# Patient Record
Sex: FEMALE | Race: WHITE | NOT HISPANIC OR LATINO | URBAN - METROPOLITAN AREA
[De-identification: names, ages, dates, MRNs, and addresses within clinical notes are randomized per-mention and may not be internally consistent; named-entity substitution may affect disease eponyms.]

---

## 2017-01-13 ENCOUNTER — OUTPATIENT (OUTPATIENT)
Dept: OUTPATIENT SERVICES | Facility: HOSPITAL | Age: 47
LOS: 1 days | End: 2017-01-13
Payer: COMMERCIAL

## 2017-01-13 ENCOUNTER — APPOINTMENT (OUTPATIENT)
Dept: ULTRASOUND IMAGING | Facility: HOSPITAL | Age: 47
End: 2017-01-13

## 2017-01-13 PROCEDURE — 93880 EXTRACRANIAL BILAT STUDY: CPT

## 2017-01-23 ENCOUNTER — APPOINTMENT (OUTPATIENT)
Dept: MRI IMAGING | Facility: HOSPITAL | Age: 47
End: 2017-01-23

## 2017-01-23 ENCOUNTER — OUTPATIENT (OUTPATIENT)
Dept: OUTPATIENT SERVICES | Facility: HOSPITAL | Age: 47
LOS: 1 days | End: 2017-01-23
Payer: COMMERCIAL

## 2017-01-23 PROCEDURE — 70544 MR ANGIOGRAPHY HEAD W/O DYE: CPT

## 2017-04-28 ENCOUNTER — APPOINTMENT (OUTPATIENT)
Dept: MRI IMAGING | Facility: HOSPITAL | Age: 47
End: 2017-04-28

## 2017-04-28 ENCOUNTER — OUTPATIENT (OUTPATIENT)
Dept: OUTPATIENT SERVICES | Facility: HOSPITAL | Age: 47
LOS: 1 days | End: 2017-04-28
Payer: COMMERCIAL

## 2017-04-28 ENCOUNTER — APPOINTMENT (OUTPATIENT)
Dept: CT IMAGING | Facility: HOSPITAL | Age: 47
End: 2017-04-28

## 2017-04-28 PROCEDURE — A9579: CPT

## 2017-04-28 PROCEDURE — 70552 MRI BRAIN STEM W/DYE: CPT

## 2017-04-28 PROCEDURE — 70491 CT SOFT TISSUE NECK W/DYE: CPT

## 2017-04-28 PROCEDURE — 70552 MRI BRAIN STEM W/DYE: CPT | Mod: 26

## 2017-04-28 PROCEDURE — 70491 CT SOFT TISSUE NECK W/DYE: CPT | Mod: 26

## 2017-05-11 ENCOUNTER — OUTPATIENT (OUTPATIENT)
Dept: OUTPATIENT SERVICES | Facility: HOSPITAL | Age: 47
LOS: 1 days | End: 2017-05-11
Payer: COMMERCIAL

## 2017-05-11 VITALS
OXYGEN SATURATION: 98 % | SYSTOLIC BLOOD PRESSURE: 120 MMHG | TEMPERATURE: 98 F | RESPIRATION RATE: 20 BRPM | DIASTOLIC BLOOD PRESSURE: 78 MMHG | HEART RATE: 82 BPM | WEIGHT: 164.24 LBS | HEIGHT: 68.5 IN

## 2017-05-11 DIAGNOSIS — Z01.818 ENCOUNTER FOR OTHER PREPROCEDURAL EXAMINATION: ICD-10-CM

## 2017-05-11 DIAGNOSIS — I67.1 CEREBRAL ANEURYSM, NONRUPTURED: ICD-10-CM

## 2017-05-11 DIAGNOSIS — Z98.890 OTHER SPECIFIED POSTPROCEDURAL STATES: Chronic | ICD-10-CM

## 2017-05-11 LAB
ANION GAP SERPL CALC-SCNC: 17 MMOL/L — SIGNIFICANT CHANGE UP (ref 5–17)
BLD GP AB SCN SERPL QL: NEGATIVE — SIGNIFICANT CHANGE UP
BUN SERPL-MCNC: 17 MG/DL — SIGNIFICANT CHANGE UP (ref 7–23)
CALCIUM SERPL-MCNC: 9.8 MG/DL — SIGNIFICANT CHANGE UP (ref 8.4–10.5)
CHLORIDE SERPL-SCNC: 100 MMOL/L — SIGNIFICANT CHANGE UP (ref 96–108)
CO2 SERPL-SCNC: 21 MMOL/L — LOW (ref 22–31)
CREAT SERPL-MCNC: 0.78 MG/DL — SIGNIFICANT CHANGE UP (ref 0.5–1.3)
GLUCOSE SERPL-MCNC: 83 MG/DL — SIGNIFICANT CHANGE UP (ref 70–99)
HCT VFR BLD CALC: 41.2 % — SIGNIFICANT CHANGE UP (ref 34.5–45)
HGB BLD-MCNC: 14 G/DL — SIGNIFICANT CHANGE UP (ref 11.5–15.5)
MCHC RBC-ENTMCNC: 30.7 PG — SIGNIFICANT CHANGE UP (ref 27–34)
MCHC RBC-ENTMCNC: 34 GM/DL — SIGNIFICANT CHANGE UP (ref 32–36)
MCV RBC AUTO: 90.4 FL — SIGNIFICANT CHANGE UP (ref 80–100)
PLATELET # BLD AUTO: 243 K/UL — SIGNIFICANT CHANGE UP (ref 150–400)
POTASSIUM SERPL-MCNC: 4.2 MMOL/L — SIGNIFICANT CHANGE UP (ref 3.5–5.3)
POTASSIUM SERPL-SCNC: 4.2 MMOL/L — SIGNIFICANT CHANGE UP (ref 3.5–5.3)
RBC # BLD: 4.56 M/UL — SIGNIFICANT CHANGE UP (ref 3.8–5.2)
RBC # FLD: 12.2 % — SIGNIFICANT CHANGE UP (ref 10.3–14.5)
RH IG SCN BLD-IMP: NEGATIVE — SIGNIFICANT CHANGE UP
SODIUM SERPL-SCNC: 138 MMOL/L — SIGNIFICANT CHANGE UP (ref 135–145)
WBC # BLD: 3.87 K/UL — SIGNIFICANT CHANGE UP (ref 3.8–10.5)
WBC # FLD AUTO: 3.87 K/UL — SIGNIFICANT CHANGE UP (ref 3.8–10.5)

## 2017-05-11 PROCEDURE — 86900 BLOOD TYPING SEROLOGIC ABO: CPT

## 2017-05-11 PROCEDURE — 80048 BASIC METABOLIC PNL TOTAL CA: CPT

## 2017-05-11 PROCEDURE — G0463: CPT

## 2017-05-11 PROCEDURE — 86850 RBC ANTIBODY SCREEN: CPT

## 2017-05-11 PROCEDURE — 85027 COMPLETE CBC AUTOMATED: CPT

## 2017-05-11 PROCEDURE — 86901 BLOOD TYPING SEROLOGIC RH(D): CPT

## 2017-05-11 NOTE — H&P PST ADULT - RS GEN PE MLT RESP DETAILS PC
normal/respirations non-labored/airway patent/no chest wall tenderness/good air movement/breath sounds equal/clear to auscultation bilaterally

## 2017-05-11 NOTE — H&P PST ADULT - PMH
HTN (hypertension)    Hypercholesterolemia  no meds Dural arteriovenous fistula    HTN (hypertension)    Hypercholesterolemia  no meds

## 2017-05-11 NOTE — H&P PST ADULT - HISTORY OF PRESENT ILLNESS
46 yr old female with history of HTN, with pulsation & swishing in right ear, since Nov 2016- was found to have Dural fistula. Now coming in for Cerebral angiogram on 5/15/17.

## 2017-05-11 NOTE — H&P PST ADULT - NSANTHOSAYNRD_GEN_A_CORE
No. REGULO screening performed.  STOP BANG Legend: 0-2 = LOW Risk; 3-4 = INTERMEDIATE Risk; 5-8 = HIGH Risk

## 2017-05-14 ENCOUNTER — FORM ENCOUNTER (OUTPATIENT)
Age: 47
End: 2017-05-14

## 2017-05-15 ENCOUNTER — OUTPATIENT (OUTPATIENT)
Dept: OUTPATIENT SERVICES | Facility: HOSPITAL | Age: 47
LOS: 1 days | End: 2017-05-15
Payer: COMMERCIAL

## 2017-05-15 ENCOUNTER — APPOINTMENT (OUTPATIENT)
Dept: NEUROSURGERY | Facility: CLINIC | Age: 47
End: 2017-05-15

## 2017-05-15 DIAGNOSIS — I67.1 CEREBRAL ANEURYSM, NONRUPTURED: ICD-10-CM

## 2017-05-15 DIAGNOSIS — Q28.2 ARTERIOVENOUS MALFORMATION OF CEREBRAL VESSELS: ICD-10-CM

## 2017-05-15 DIAGNOSIS — Z98.890 OTHER SPECIFIED POSTPROCEDURAL STATES: Chronic | ICD-10-CM

## 2017-05-15 DIAGNOSIS — Z01.818 ENCOUNTER FOR OTHER PREPROCEDURAL EXAMINATION: ICD-10-CM

## 2017-05-15 LAB
BASOPHILS # BLD AUTO: 0.1 K/UL — SIGNIFICANT CHANGE UP (ref 0–0.2)
BASOPHILS NFR BLD AUTO: 0.9 % — SIGNIFICANT CHANGE UP (ref 0–2)
BUN SERPL-MCNC: 9 MG/DL — SIGNIFICANT CHANGE UP (ref 7–23)
CALCIUM SERPL-MCNC: 8.5 MG/DL — SIGNIFICANT CHANGE UP (ref 8.4–10.5)
CHLORIDE SERPL-SCNC: 105 MMOL/L — SIGNIFICANT CHANGE UP (ref 96–108)
CO2 SERPL-SCNC: 22 MMOL/L — SIGNIFICANT CHANGE UP (ref 22–31)
CREAT SERPL-MCNC: 0.58 MG/DL — SIGNIFICANT CHANGE UP (ref 0.5–1.3)
EOSINOPHIL # BLD AUTO: 0.1 K/UL — SIGNIFICANT CHANGE UP (ref 0–0.5)
EOSINOPHIL NFR BLD AUTO: 1.6 % — SIGNIFICANT CHANGE UP (ref 0–6)
GLUCOSE SERPL-MCNC: 107 MG/DL — HIGH (ref 70–99)
HCT VFR BLD CALC: 37.6 % — SIGNIFICANT CHANGE UP (ref 34.5–45)
HGB BLD-MCNC: 13.1 G/DL — SIGNIFICANT CHANGE UP (ref 11.5–15.5)
LYMPHOCYTES # BLD AUTO: 2 K/UL — SIGNIFICANT CHANGE UP (ref 1–3.3)
LYMPHOCYTES # BLD AUTO: 34.3 % — SIGNIFICANT CHANGE UP (ref 13–44)
MCHC RBC-ENTMCNC: 32.4 PG — SIGNIFICANT CHANGE UP (ref 27–34)
MCHC RBC-ENTMCNC: 34.8 GM/DL — SIGNIFICANT CHANGE UP (ref 32–36)
MCV RBC AUTO: 93.3 FL — SIGNIFICANT CHANGE UP (ref 80–100)
MONOCYTES # BLD AUTO: 0.4 K/UL — SIGNIFICANT CHANGE UP (ref 0–0.9)
MONOCYTES NFR BLD AUTO: 6.4 % — SIGNIFICANT CHANGE UP (ref 2–14)
NEUTROPHILS # BLD AUTO: 3.3 K/UL — SIGNIFICANT CHANGE UP (ref 1.8–7.4)
NEUTROPHILS NFR BLD AUTO: 56.9 % — SIGNIFICANT CHANGE UP (ref 43–77)
PLATELET # BLD AUTO: 203 K/UL — SIGNIFICANT CHANGE UP (ref 150–400)
POTASSIUM SERPL-MCNC: 3.7 MMOL/L — SIGNIFICANT CHANGE UP (ref 3.5–5.3)
POTASSIUM SERPL-SCNC: 3.7 MMOL/L — SIGNIFICANT CHANGE UP (ref 3.5–5.3)
RBC # BLD: 4.03 M/UL — SIGNIFICANT CHANGE UP (ref 3.8–5.2)
RBC # FLD: 11.2 % — SIGNIFICANT CHANGE UP (ref 10.3–14.5)
RH IG SCN BLD-IMP: NEGATIVE — SIGNIFICANT CHANGE UP
SODIUM SERPL-SCNC: 139 MMOL/L — SIGNIFICANT CHANGE UP (ref 135–145)
WBC # BLD: 5.8 K/UL — SIGNIFICANT CHANGE UP (ref 3.8–10.5)
WBC # FLD AUTO: 5.8 K/UL — SIGNIFICANT CHANGE UP (ref 3.8–10.5)

## 2017-05-15 PROCEDURE — 36226 PLACE CATH VERTEBRAL ART: CPT | Mod: 50

## 2017-05-15 PROCEDURE — 86850 RBC ANTIBODY SCREEN: CPT

## 2017-05-15 PROCEDURE — 85027 COMPLETE CBC AUTOMATED: CPT

## 2017-05-15 PROCEDURE — 86900 BLOOD TYPING SEROLOGIC ABO: CPT

## 2017-05-15 PROCEDURE — 36227 PLACE CATH XTRNL CAROTID: CPT

## 2017-05-15 PROCEDURE — 36227 PLACE CATH XTRNL CAROTID: CPT | Mod: 50

## 2017-05-15 PROCEDURE — C1769: CPT

## 2017-05-15 PROCEDURE — C1894: CPT

## 2017-05-15 PROCEDURE — C1887: CPT

## 2017-05-15 PROCEDURE — 36226 PLACE CATH VERTEBRAL ART: CPT

## 2017-05-15 PROCEDURE — 80048 BASIC METABOLIC PNL TOTAL CA: CPT

## 2017-05-15 PROCEDURE — 76377 3D RENDER W/INTRP POSTPROCES: CPT | Mod: 26

## 2017-05-15 PROCEDURE — 36223 PLACE CATH CAROTID/INOM ART: CPT

## 2017-05-15 PROCEDURE — 86901 BLOOD TYPING SEROLOGIC RH(D): CPT

## 2017-05-15 PROCEDURE — 36223 PLACE CATH CAROTID/INOM ART: CPT | Mod: 50

## 2017-05-15 RX ORDER — SODIUM CHLORIDE 9 MG/ML
1000 INJECTION INTRAMUSCULAR; INTRAVENOUS; SUBCUTANEOUS
Qty: 0 | Refills: 0 | Status: DISCONTINUED | OUTPATIENT
Start: 2017-05-15 | End: 2017-05-30

## 2017-05-23 ENCOUNTER — APPOINTMENT (OUTPATIENT)
Dept: NEUROSURGERY | Facility: CLINIC | Age: 47
End: 2017-05-23

## 2017-05-23 DIAGNOSIS — Z83.42 FAMILY HISTORY OF FAMILIAL HYPERCHOLESTEROLEMIA: ICD-10-CM

## 2017-05-23 DIAGNOSIS — Q75.2 HYPERTELORISM: ICD-10-CM

## 2017-05-23 DIAGNOSIS — Z86.39 PERSONAL HISTORY OF OTHER ENDOCRINE, NUTRITIONAL AND METABOLIC DISEASE: ICD-10-CM

## 2017-05-23 DIAGNOSIS — Z82.49 FAMILY HISTORY OF ISCHEMIC HEART DISEASE AND OTHER DISEASES OF THE CIRCULATORY SYSTEM: ICD-10-CM

## 2017-05-30 ENCOUNTER — APPOINTMENT (OUTPATIENT)
Dept: OPHTHALMOLOGY | Facility: CLINIC | Age: 47
End: 2017-05-30

## 2017-05-30 DIAGNOSIS — Z87.898 PERSONAL HISTORY OF OTHER SPECIFIED CONDITIONS: ICD-10-CM

## 2017-05-30 RX ORDER — CHOLECALCIFEROL (VITAMIN D3) 50 MCG
2000 CAPSULE ORAL
Refills: 0 | Status: ACTIVE | COMMUNITY

## 2017-06-09 ENCOUNTER — APPOINTMENT (OUTPATIENT)
Dept: NEUROSURGERY | Facility: CLINIC | Age: 47
End: 2017-06-09

## 2017-06-21 ENCOUNTER — INPATIENT (INPATIENT)
Facility: HOSPITAL | Age: 47
LOS: 2 days | Discharge: HOME | DRG: 27 | End: 2017-06-24
Attending: NEUROLOGICAL SURGERY | Admitting: NEUROLOGICAL SURGERY
Payer: COMMERCIAL

## 2017-06-21 ENCOUNTER — APPOINTMENT (OUTPATIENT)
Dept: OTOLARYNGOLOGY | Facility: CLINIC | Age: 47
End: 2017-06-21

## 2017-06-21 ENCOUNTER — APPOINTMENT (OUTPATIENT)
Dept: NEUROSURGERY | Facility: CLINIC | Age: 47
End: 2017-06-21

## 2017-06-21 VITALS — TEMPERATURE: 98 F

## 2017-06-21 DIAGNOSIS — Z98.890 OTHER SPECIFIED POSTPROCEDURAL STATES: Chronic | ICD-10-CM

## 2017-06-21 DIAGNOSIS — Z01.818 ENCOUNTER FOR OTHER PREPROCEDURAL EXAMINATION: ICD-10-CM

## 2017-06-21 DIAGNOSIS — I67.1 CEREBRAL ANEURYSM, NONRUPTURED: ICD-10-CM

## 2017-06-21 LAB
ANION GAP SERPL CALC-SCNC: 17 MMOL/L — SIGNIFICANT CHANGE UP (ref 5–17)
ANION GAP SERPL CALC-SCNC: 17 MMOL/L — SIGNIFICANT CHANGE UP (ref 5–17)
APTT BLD: > 200 SEC (ref 27.5–37.4)
BASOPHILS # BLD AUTO: 0 K/UL — SIGNIFICANT CHANGE UP (ref 0–0.2)
BASOPHILS # BLD AUTO: 0 K/UL — SIGNIFICANT CHANGE UP (ref 0–0.2)
BASOPHILS NFR BLD AUTO: 0.1 % — SIGNIFICANT CHANGE UP (ref 0–2)
BASOPHILS NFR BLD AUTO: 0.2 % — SIGNIFICANT CHANGE UP (ref 0–2)
BLD GP AB SCN SERPL QL: NEGATIVE — SIGNIFICANT CHANGE UP
BUN SERPL-MCNC: 10 MG/DL — SIGNIFICANT CHANGE UP (ref 7–23)
BUN SERPL-MCNC: 8 MG/DL — SIGNIFICANT CHANGE UP (ref 7–23)
CALCIUM SERPL-MCNC: 6.4 MG/DL — CRITICAL LOW (ref 8.4–10.5)
CALCIUM SERPL-MCNC: 7.8 MG/DL — LOW (ref 8.4–10.5)
CHLORIDE SERPL-SCNC: 109 MMOL/L — HIGH (ref 96–108)
CHLORIDE SERPL-SCNC: 115 MMOL/L — HIGH (ref 96–108)
CO2 SERPL-SCNC: 15 MMOL/L — LOW (ref 22–31)
CO2 SERPL-SCNC: 17 MMOL/L — LOW (ref 22–31)
CREAT SERPL-MCNC: 0.54 MG/DL — SIGNIFICANT CHANGE UP (ref 0.5–1.3)
CREAT SERPL-MCNC: 0.6 MG/DL — SIGNIFICANT CHANGE UP (ref 0.5–1.3)
EOSINOPHIL # BLD AUTO: 0 K/UL — SIGNIFICANT CHANGE UP (ref 0–0.5)
EOSINOPHIL # BLD AUTO: 0 K/UL — SIGNIFICANT CHANGE UP (ref 0–0.5)
EOSINOPHIL NFR BLD AUTO: 0.3 % — SIGNIFICANT CHANGE UP (ref 0–6)
EOSINOPHIL NFR BLD AUTO: 0.5 % — SIGNIFICANT CHANGE UP (ref 0–6)
GLUCOSE SERPL-MCNC: 121 MG/DL — HIGH (ref 70–99)
GLUCOSE SERPL-MCNC: 151 MG/DL — HIGH (ref 70–99)
HCT VFR BLD CALC: 35.8 % — SIGNIFICANT CHANGE UP (ref 34.5–45)
HCT VFR BLD CALC: 36.5 % — SIGNIFICANT CHANGE UP (ref 34.5–45)
HGB BLD-MCNC: 13.1 G/DL — SIGNIFICANT CHANGE UP (ref 11.5–15.5)
HGB BLD-MCNC: 13.2 G/DL — SIGNIFICANT CHANGE UP (ref 11.5–15.5)
INR BLD: 1.33 RATIO — HIGH (ref 0.88–1.16)
LYMPHOCYTES # BLD AUTO: 0.4 K/UL — LOW (ref 1–3.3)
LYMPHOCYTES # BLD AUTO: 0.8 K/UL — LOW (ref 1–3.3)
LYMPHOCYTES # BLD AUTO: 11.9 % — LOW (ref 13–44)
LYMPHOCYTES # BLD AUTO: 4.8 % — LOW (ref 13–44)
MAGNESIUM SERPL-MCNC: 1.4 MG/DL — LOW (ref 1.6–2.6)
MAGNESIUM SERPL-MCNC: 1.5 MG/DL — LOW (ref 1.6–2.6)
MCHC RBC-ENTMCNC: 33.6 PG — SIGNIFICANT CHANGE UP (ref 27–34)
MCHC RBC-ENTMCNC: 33.9 PG — SIGNIFICANT CHANGE UP (ref 27–34)
MCHC RBC-ENTMCNC: 36.2 GM/DL — HIGH (ref 32–36)
MCHC RBC-ENTMCNC: 36.4 GM/DL — HIGH (ref 32–36)
MCV RBC AUTO: 92.9 FL — SIGNIFICANT CHANGE UP (ref 80–100)
MCV RBC AUTO: 92.9 FL — SIGNIFICANT CHANGE UP (ref 80–100)
MONOCYTES # BLD AUTO: 0 K/UL — SIGNIFICANT CHANGE UP (ref 0–0.9)
MONOCYTES # BLD AUTO: 0.1 K/UL — SIGNIFICANT CHANGE UP (ref 0–0.9)
MONOCYTES NFR BLD AUTO: 0 % — LOW (ref 2–14)
MONOCYTES NFR BLD AUTO: 1 % — LOW (ref 2–14)
NEUTROPHILS # BLD AUTO: 5.9 K/UL — SIGNIFICANT CHANGE UP (ref 1.8–7.4)
NEUTROPHILS # BLD AUTO: 8.2 K/UL — HIGH (ref 1.8–7.4)
NEUTROPHILS NFR BLD AUTO: 86.6 % — HIGH (ref 43–77)
NEUTROPHILS NFR BLD AUTO: 94.5 % — HIGH (ref 43–77)
PHOSPHATE SERPL-MCNC: 3.6 MG/DL — SIGNIFICANT CHANGE UP (ref 2.5–4.5)
PHOSPHATE SERPL-MCNC: 7 MG/DL — HIGH (ref 2.5–4.5)
PLATELET # BLD AUTO: 179 K/UL — SIGNIFICANT CHANGE UP (ref 150–400)
PLATELET # BLD AUTO: 182 K/UL — SIGNIFICANT CHANGE UP (ref 150–400)
POTASSIUM SERPL-MCNC: 3.4 MMOL/L — LOW (ref 3.5–5.3)
POTASSIUM SERPL-MCNC: 3.6 MMOL/L — SIGNIFICANT CHANGE UP (ref 3.5–5.3)
POTASSIUM SERPL-SCNC: 3.4 MMOL/L — LOW (ref 3.5–5.3)
POTASSIUM SERPL-SCNC: 3.6 MMOL/L — SIGNIFICANT CHANGE UP (ref 3.5–5.3)
PROTHROM AB SERPL-ACNC: 14.4 SEC — HIGH (ref 9.8–12.7)
RBC # BLD: 3.86 M/UL — SIGNIFICANT CHANGE UP (ref 3.8–5.2)
RBC # BLD: 3.93 M/UL — SIGNIFICANT CHANGE UP (ref 3.8–5.2)
RBC # FLD: 11.3 % — SIGNIFICANT CHANGE UP (ref 10.3–14.5)
RBC # FLD: 11.5 % — SIGNIFICANT CHANGE UP (ref 10.3–14.5)
RH IG SCN BLD-IMP: NEGATIVE — SIGNIFICANT CHANGE UP
SODIUM SERPL-SCNC: 143 MMOL/L — SIGNIFICANT CHANGE UP (ref 135–145)
SODIUM SERPL-SCNC: 147 MMOL/L — HIGH (ref 135–145)
WBC # BLD: 6.8 K/UL — SIGNIFICANT CHANGE UP (ref 3.8–10.5)
WBC # BLD: 8.6 K/UL — SIGNIFICANT CHANGE UP (ref 3.8–10.5)
WBC # FLD AUTO: 6.8 K/UL — SIGNIFICANT CHANGE UP (ref 3.8–10.5)
WBC # FLD AUTO: 8.6 K/UL — SIGNIFICANT CHANGE UP (ref 3.8–10.5)

## 2017-06-21 PROCEDURE — 99291 CRITICAL CARE FIRST HOUR: CPT

## 2017-06-21 PROCEDURE — 75894 X-RAYS TRANSCATH THERAPY: CPT | Mod: 26

## 2017-06-21 PROCEDURE — 36226 PLACE CATH VERTEBRAL ART: CPT | Mod: LT

## 2017-06-21 PROCEDURE — 75898 FOLLOW-UP ANGIOGRAPHY: CPT | Mod: 26

## 2017-06-21 PROCEDURE — 36012 PLACE CATHETER IN VEIN: CPT | Mod: 59

## 2017-06-21 PROCEDURE — 61624 TCAT PERM OCCLS/EMBOLJ CNS: CPT

## 2017-06-21 PROCEDURE — 36223 PLACE CATH CAROTID/INOM ART: CPT | Mod: RT

## 2017-06-21 PROCEDURE — 36227 PLACE CATH XTRNL CAROTID: CPT | Mod: RT

## 2017-06-21 RX ORDER — POTASSIUM CHLORIDE 20 MEQ
20 PACKET (EA) ORAL
Qty: 0 | Refills: 0 | Status: COMPLETED | OUTPATIENT
Start: 2017-06-21 | End: 2017-06-21

## 2017-06-21 RX ORDER — HEPARIN SODIUM 5000 [USP'U]/ML
5000 INJECTION INTRAVENOUS; SUBCUTANEOUS EVERY 8 HOURS
Qty: 0 | Refills: 0 | Status: DISCONTINUED | OUTPATIENT
Start: 2017-06-21 | End: 2017-06-24

## 2017-06-21 RX ORDER — HEPARIN SODIUM 5000 [USP'U]/ML
5000 INJECTION INTRAVENOUS; SUBCUTANEOUS EVERY 8 HOURS
Qty: 0 | Refills: 0 | Status: DISCONTINUED | OUTPATIENT
Start: 2017-06-21 | End: 2017-06-21

## 2017-06-21 RX ORDER — DEXAMETHASONE 0.5 MG/5ML
2 ELIXIR ORAL EVERY 8 HOURS
Qty: 0 | Refills: 0 | Status: DISCONTINUED | OUTPATIENT
Start: 2017-06-21 | End: 2017-06-22

## 2017-06-21 RX ORDER — MAGNESIUM SULFATE 500 MG/ML
2 VIAL (ML) INJECTION ONCE
Qty: 0 | Refills: 0 | Status: COMPLETED | OUTPATIENT
Start: 2017-06-21 | End: 2017-06-21

## 2017-06-21 RX ORDER — CALCIUM GLUCONATE 100 MG/ML
1 VIAL (ML) INTRAVENOUS ONCE
Qty: 1 | Refills: 0 | Status: COMPLETED | OUTPATIENT
Start: 2017-06-21 | End: 2017-06-21

## 2017-06-21 RX ORDER — FAMOTIDINE 10 MG/ML
20 INJECTION INTRAVENOUS DAILY
Qty: 0 | Refills: 0 | Status: DISCONTINUED | OUTPATIENT
Start: 2017-06-21 | End: 2017-06-24

## 2017-06-21 RX ORDER — SODIUM CHLORIDE 9 MG/ML
1000 INJECTION INTRAMUSCULAR; INTRAVENOUS; SUBCUTANEOUS
Qty: 0 | Refills: 0 | Status: DISCONTINUED | OUTPATIENT
Start: 2017-06-21 | End: 2017-06-22

## 2017-06-21 RX ORDER — ACETAMINOPHEN 500 MG
1000 TABLET ORAL ONCE
Qty: 0 | Refills: 0 | Status: COMPLETED | OUTPATIENT
Start: 2017-06-21 | End: 2017-06-21

## 2017-06-21 RX ORDER — POTASSIUM CHLORIDE 20 MEQ
40 PACKET (EA) ORAL EVERY 4 HOURS
Qty: 0 | Refills: 0 | Status: COMPLETED | OUTPATIENT
Start: 2017-06-21 | End: 2017-06-22

## 2017-06-21 RX ORDER — CEFAZOLIN SODIUM 1 G
1000 VIAL (EA) INJECTION ONCE
Qty: 0 | Refills: 0 | Status: COMPLETED | OUTPATIENT
Start: 2017-06-21 | End: 2017-06-21

## 2017-06-21 RX ADMIN — Medication 200 GRAM(S): at 20:40

## 2017-06-21 RX ADMIN — Medication 100 MILLIGRAM(S): at 15:54

## 2017-06-21 RX ADMIN — Medication 20 MILLIEQUIVALENT(S): at 19:05

## 2017-06-21 RX ADMIN — SODIUM CHLORIDE 100 MILLILITER(S): 9 INJECTION INTRAMUSCULAR; INTRAVENOUS; SUBCUTANEOUS at 18:00

## 2017-06-21 RX ADMIN — HEPARIN SODIUM 5000 UNIT(S): 5000 INJECTION INTRAVENOUS; SUBCUTANEOUS at 19:05

## 2017-06-21 RX ADMIN — Medication 2 MILLIGRAM(S): at 21:10

## 2017-06-21 RX ADMIN — Medication 400 MILLIGRAM(S): at 21:10

## 2017-06-21 RX ADMIN — Medication 40 MILLIEQUIVALENT(S): at 21:10

## 2017-06-21 RX ADMIN — Medication 20 MILLIEQUIVALENT(S): at 16:50

## 2017-06-21 RX ADMIN — Medication 200 GRAM(S): at 17:42

## 2017-06-21 RX ADMIN — Medication 1000 MILLIGRAM(S): at 21:30

## 2017-06-21 RX ADMIN — Medication 50 GRAM(S): at 20:52

## 2017-06-21 NOTE — CHART NOTE - NSCHARTNOTEFT_GEN_A_CORE
Interventional Neuro Radiology  Pre-Procedure Note PA-C    This is a 46 year old right hand dominant with complaints of a right bruit.  Patient is s/post diagnostic cerebral angiography 0n 5- which revealed a right sigmoid sinus   AVM.  Patient returns to Neuro IR for a selective cerebral angiography and endovascular treatment of AVM.  Upon exam patient is A+O 3, speech is fluent, ambulates  without assist.  Allergies: No Known Allergies  PMHX: dural AVM, HTN, hiatal hernia,   PSHX: hernia repair x 2  D+C   diagnostic cerebral angiography 5-   Social History:   non smoker   FAMILY HISTORY: no pertinent family history      Current Medications: lisinopril 2.5 mg       Basic Metabolic Panel    Sodium, Serum: 139     Potassium, Serum: 3.7    Chloride, Serum: 105     Carbon Dioxide, Serum: 22     Blood Urea Nitrogen, Serum:     Creatinine, Serum: 0.58     Glucose, Serum: 107       WBC Count: 5.8     Hemoglobin: 13.1     Hematocrit: 37.6     Platelet Count : 203       Blood Bank: A negative      Assessment/Plan:   This is a 46  year old right  hand dominant Female who returns ro Neuro IR for a selective cerebral angiography and endovascular treatment of right sigmoid sinus AVM.  Procedure, goals, risks, benefits and alternatives  were discussed with patient and patient's .   All questions were answered.  Risks include but are not limited to stroke, vessel injury, hemorrhage, and or right  groin hematoma.  Patient demonstrates understanding  of all risks involved with this procedure and wishes to continue.   Appropriate  content was obtained from patient and consent is in the patient's chart.

## 2017-06-21 NOTE — PROGRESS NOTE ADULT - SUBJECTIVE AND OBJECTIVE BOX
HPI:46 yr old female with history of HTN, with pulsation & swishing in right ear, since Nov 2016- was found to have Dural fistula. Pt now POD 0 from embolization    SURGERY:   PAST MEDICAL HISTORY: Dural arteriovenous fistula  HTN (hypertension)  Hypercholesterolemia  Hiatal hernia    PAST SURGICAL HISTORY: H/O umbilical hernia repair  S/P inguinal hernia repair  S/P D&amp;C    FAMILY HISTORY:  No pertinent family history in first degree relatives    ALLERGIES: No Known Allergies    **************************************  **************************************    OVERNIGHT EVENTS: [] None    ROS  Unobtainable due to mental status[] Negative [x]  Positives:    ADMISSION SCORES: GCS: HH: MF: NIHSS: RASS: CAM-ICU: ICP:    ICU Vital Signs Last 24 Hrs  T(C): --  T(F): --  HR: --  BP: --  BP(mean): --  ABP: --  ABP(mean): --  RR: --  SpO2: --          DEVICES: [] Restraints [] YASMIN/HMV []LD [] ET tube [] Trach [] Chest Tube [x] A-line [x] Piña [] NGT [] Rectal Tube [] EVD [] CVL  [] ICP/LiCOx    NEUROIMAGING:     EEG REPORT:     MEDICATIONS:  sodium chloride 0.9%. 1000milliLiter(s) IV Continuous <Continuous>  dexamethasone     Tablet 2milliGRAM(s) Oral every 8 hours  heparin  Injectable 5000Unit(s) SubCutaneous every 8 hours  famotidine    Tablet 20milliGRAM(s) Oral daily      PHYSICAL EXAM:  General:NAD  Neurological: A/Ox3; fluent; follows; pupils=; EOMI Face=; motor 5/5 in arms no drift-dorsi and plantar flexion 5/5    Lungs:clear  Heart:regular  Abdomen:soft  Extremities: no edema  Skin:skin well perfused; good distal pulses      LABS:   06-21    147<H>  |  115<H>  |  10  ----------------------------<  121<H>  3.6   |  15<L>  |  0.54    Phos  7.0     06-21  Mg     1.5     06-21

## 2017-06-21 NOTE — PROGRESS NOTE ADULT - ASSESSMENT
ASSESSMENT/PLAN:  Pt is POD 0 from embolization. Neuro checks q1hr  Check CBC, BMP and coags now and 7pm.   CT in am; cont villavicencio for now    [x] Patient is at high risk of neurologic deterioration/death due to: post operative hemorrhage, stroke     Time seen: 3:00pm  Time spent: _45__ [x] critical care minutes

## 2017-06-21 NOTE — CHART NOTE - NSCHARTNOTEFT_GEN_A_CORE
Interventional Neuro- Radiology   Procedure Note PA-C    Procedure:      Selective Cerebral Angiography   Pre- Procedure Diagnosis: right sigmoid sinus dural AVM  Post- Procedure Diagnosis: right sigmoid sinus dural AVM    : Dr. Pasha Nicole     Physician Assistant: Lois Morales PA-C  Nurse:                     Pauly Wells RN     Anesthesiologist:      Dr Jusitno Velazquez                general anesthesia      Sheath:    I/Os:  Estimated blood loss less than 50cc     IV fluids:     cc       Urine output     cc          Contrast Omnipaque 240    cc       Antibiotics:    Vitals: BP         HR      Spo2    %      Spo2    %    Preliminary Report:    Using a 6 Fr short/long sheath to the right groin under MAC sedation via   left vertebral artery,  left common carotid artery, left external carotid artery, right vertebral artery,  right common carotid artery, right external carotid artery  a selective cerebral angiography was performed and  demonstrates      ( Official note to follow).  Patient tolerated procedure well, hemodynamically stable, no change in neurological status compared to baseline.  Results discussed with neurosurgery, patient and patient's  family.  Groin sheath was removed,  manual compression held to hemostasis  for  21 minutes, no active bleeding, no hematoma, Avitene applied,  quick clot and safeguard balloon dressing applied at _____h.  STAT labs:  CBC BMP  ____h.  Patient transferred to Recovery Room Interventional Neuro- Radiology   Procedure Note PA-C    Procedure:      Selective Cerebral Angiography and embolization with 7 coils, Onxy 18  0.7cc and micro particles  1.0cc  Pre- Procedure Diagnosis: right sigmoid sinus dural AVM  Post- Procedure Diagnosis: right sigmoid sinus dural AVM    : Dr. Pasha Nicole     Physician Assistant:      Lois Morales PA-C  Nurse:                          Pauly Wells RN   Radiologic technologist: Chris D'Amico   LRT  Anesthesiologist:           Dr Justino Velazquez                general anesthesia      Sheath:  6 [place]inean short sheath right femoral artery     7 [place]ine[place] short sheath left femoral vein        5 [place] short sheath to the right femoral vein     I/Os:  Estimated blood loss less than 50cc     IV fluids:1400cc       Urine output 3600cc       Contrast Omnipaque 240    cc       Antibiotics: Ancef 2grams    Vitals: /75       HR 77      [id number] % general anesthesia    Preliminary Report:  Using a 6 Arabic short sheath to the right groin, and a 7 [place]inean to left groin under general anesthesia  via left vertebral artery, right common carotid artery, right external carotid artery   right occipital, right ascending pharyngeal a selective cerebral angiography was performed and  demonstrates      ( Official note to follow).  Patient tolerated procedure well, hemodynamically stable, no change in neurological status compared to baseline.  Results discussed with neurosurgery, patient and patient's  family.  Bilateral Groin sheath were removed, manual compression held to hemostasis  for  21 minutes, no active bleeding, no hematoma, Avitene applied,  quick clot and safeguard balloon dressing applied at   STAT labs:  CBC BMP    Patient transferred to Neuro ICU Bed 6 Interventional Neuro- Radiology   Procedure Note PA-C    Procedure:      Selective Cerebral Angiography and embolization with 7 coils, Onxy 18  0.7cc and micro particles  1.0cc  Pre- Procedure Diagnosis: right sigmoid sinus dural AVM  Post- Procedure Diagnosis: right sigmoid sinus dural AVM    : Dr. Pasha Nicole     Physician Assistant:       Lois Morales PA-C  Nurse:                            Pauly Wells RN   Radiologic technologist:  Chris D'Amico   LRT  Anesthesiologist:            Dr Justino Velazquez                general anesthesia      Sheath:  6 Turkmen short sheath right femoral artery     7 Turkmen short sheath left femoral vein        5 Turkmen short sheath to the right femoral vein     I/Os:  Estimated blood loss less than 50cc     IV fluids:1400cc       Urine output 3600cc       Contrast Omnipaque 240    cc       Antibiotics: Ancef 2grams    Vitals: /75       HR 77      [id number] % general anesthesia    Preliminary Report:  Using a 6 [place] citizen short sheath to the right groin, and a 7 Turkmen to left groin under general anesthesia  via left vertebral artery, right common carotid artery, right external carotid artery   right occipital, right ascending pharyngeal a selective cerebral angiography was performed and  demonstrates      ( Official note to follow).  Patient tolerated procedure well, hemodynamically stable, no change in neurological status compared to baseline.  Results discussed with neurosurgery, patient and patient's  family.  Bilateral Groin sheath were removed, manual compression held to hemostasis  for  21 minutes, no active bleeding, no hematoma, Avitene applied,  quick clot and safeguard balloon dressing applied at   STAT labs:  CBC BMP    Patient transferred to Neuro ICU Bed 6 Interventional Neuro- Radiology   Procedure Note PA-C    Procedure:      Selective Cerebral Angiography and embolization with 7 coils, Onxy 18  0.7cc and micro particles  1.0cc  Pre- Procedure Diagnosis: right sigmoid sinus dural AVM  Post- Procedure Diagnosis: right sigmoid sinus dural AVM    : Dr. Pasha Nicole     Physician Assistant:       Lois Morales PA-C  Nurse:                            Pauly Wells RN   Radiologic technologist: Chris D'Amico   LRT  Anesthesiologist:            Dr Justino Velazquez                general anesthesia      Sheath:  6 Czech short sheath right femoral artery     7 Czech short sheath left femoral vein        5 Czech short sheath to the right femoral vein     I/Os:  Estimated blood loss less than 50cc     IV fluids:1400cc       Urine output 3600cc       Contrast Omnipaque 240    cc       Antibiotics: Ancef 2grams    Vitals: /75       HR 77      [id number] % general anesthesia    Preliminary Report:  Using a 6 Austrian short sheath to the right groin, and a 7 Czech to left groin under general anesthesia  via left vertebral artery, right common carotid artery, right external carotid artery   right occipital, right ascending pharyngeal a selective cerebral angiography was performed and  demonstrates      ( Official note to follow).  Patient tolerated procedure well, hemodynamically stable, no change in neurological status compared to baseline.  Results discussed with neurosurgery, patient and patient's  family.  Bilateral Groin sheath were removed, manual compression held to hemostasis  for  21 minutes, no active bleeding, no hematoma, Avitene applied,  quick clot and safeguard balloon dressing applied at   STAT labs:  CBC BMP    Patient transferred to Neuro ICU Bed 6 Interventional Neuro- Radiology   Procedure Note PA-C    Procedure:      Selective Cerebral Angiography and embolization with 7 coils, Onxy 18  0.7cc and micro particles  1.0cc  Pre- Procedure Diagnosis: right sigmoid sinus dural AVM  Post- Procedure Diagnosis: right sigmoid sinus dural AVM    : Dr. Pasha Nicole     Physician Assistant:       Lois Morales PA-C  Nurse:                            Pauly Wells RN   Radiologic technologist: Chris D'Amico   LRT  Anesthesiologist:            Dr Justino Velazquez                general anesthesia      Sheath:  6 Brazilian short sheath right femoral artery  7 Brazilian short sheath left femoral vein  5 Brazilian short to the femoral vein        I/Os:  Estimated blood loss less than 50cc     IV fluids:1400cc       Urine output 3600cc       Contrast Omnipaque 240    cc       Antibiotics: Ancef 2grams    Vitals: /75       HR 77      [id number] % general anesthesia    Preliminary Report:  Using a 6 Kuwaiti short sheath to the right groin, and a 7 Brazilian to left groin under general anesthesia  via left vertebral artery, right common carotid artery, right external carotid artery   right occipital, right ascending pharyngeal a selective cerebral angiography was performed and  demonstrates      ( Official note to follow).  Patient tolerated procedure well, hemodynamically stable, no change in neurological status compared to baseline.  Results discussed with neurosurgery, patient and patient's  family.  Bilateral Groin sheath were removed, manual compression held to hemostasis  for  21 minutes, no active bleeding, no hematoma, Avitene applied,  quick clot and safeguard balloon dressing applied at   STAT labs:  CBC BMP    Patient transferred to Neuro ICU Bed 6 Interventional Neuro- Radiology   Procedure Note PA-C    Procedure:      Selective Cerebral Angiography and embolization with 7 coils, Onxy 18  0.7cc and micro particles  1.0cc  Pre- Procedure Diagnosis: right sigmoid sinus dural AVM  Post- Procedure Diagnosis: right sigmoid sinus dural AVM    : Dr. Pasha Nicole     Physician Assistant:       Lois Morales PA-C  Nurse:                            Pauly Wells RN   Radiologic technologist: Chris D'Amico   LRT  Anesthesiologist:            Dr Justino Velazquez                general anesthesia      Sheath:  6 Mauritian short sheath right femoral artery  7 Mauritian short sheath left femoral vein                 5 Mauritian short sheath right femoral vein   I/Os:      Estimated blood loss less than 50cc     IV fluids:1400cc       Urine output 3600cc       Contrast Omnipaque 240    cc       Antibiotics: Ancef 2grams    Vitals: /75       HR 77      [id number] % general anesthesia    Preliminary Report:  Using a 6 Central African short sheath to the right groin, and a 7 Mauritian to left groin under general anesthesia  via left vertebral artery, right common carotid artery, right external carotid artery   right occipital, right ascending pharyngeal a selective cerebral angiography was performed and  demonstrates      ( Official note to follow).  Patient tolerated procedure well, hemodynamically stable, no change in neurological status compared to baseline.  Results discussed with neurosurgery, patient and patient's  family.  Bilateral Groin sheath were removed, manual compression held to hemostasis  for  21 minutes, no active bleeding, no hematoma, Avitene applied,  quick clot and safeguard balloon dressing applied at   STAT labs:  CBC BMP PTT  Patient transferred to Neuro ICU Bed 6 Interventional Neuro- Radiology   Procedure Note PA-C    Procedure:      Selective Cerebral Angiography and embolization with 7 coils, Onxy 18  0.7cc and micro particles  1.0cc  Pre- Procedure Diagnosis: right sigmoid sinus dural AVM  Post- Procedure Diagnosis: right sigmoid sinus dural AVM    : Dr. Pasha Nicole     Physician Assistant:       Lois Morales PA-C  Nurse:                            Pauly Wells RN   Radiologic technologist: Chris D'Amico   LRT  Anesthesiologist:            Dr Justino Velazquez                general anesthesia      Sheath:  6 Croatian short sheath right femoral artery  7 Croatian short sheath left femoral vein                  5 Croatian short sheath right femoral vein   I/Os:      Estimated blood loss less than 50cc     IV fluids:1400cc       Urine output 3600cc       Contrast Omnipaque 240    cc       Antibiotics: Ancef 2grams    Vitals: /75       HR 77      [id number] % general anesthesia    Preliminary Report:  Using a 6 Citizen of [place] short sheath to the right groin, and a 7 Croatian to left groin under general anesthesia  via left vertebral artery, right common carotid artery, right external carotid artery   right occipital, right ascending pharyngeal a selective cerebral angiography was performed and  demonstrates      ( Official note to follow).  Patient tolerated procedure well, hemodynamically stable, no change in neurological status compared to baseline.  Results discussed with neurosurgery, patient and patient's  family.  Bilateral Groin sheath were removed, manual compression held to hemostasis  for  21 minutes, no active bleeding, no hematoma, Avitene applied,  quick clot and safeguard balloon dressing applied at   STAT labs:  CBC BMP PTT  Patient transferred to Neuro ICU Bed 6 Interventional Neuro- Radiology   Procedure Note PA-C    Procedure:      Selective Cerebral Angiography and embolization with 7 coils, Onxy 18  0.7cc and micro particles  1.0cc  Pre- Procedure Diagnosis: right sigmoid sinus dural AVM  Post- Procedure Diagnosis: right sigmoid sinus dural AVM    : Dr. Pasha Nicole     Physician Assistant:       Lois Morales PA-C  Nurse:                            Pauly Wells RN   Radiologic technologist: Chris D'Amico   LRT  Anesthesiologist:            Dr Justino Velazquez                general anesthesia      Sheath:  6 [place]orese short sheath right femoral artery  7 [place]se short sheath left femoral vein                  5 [place]orese short sheath right femoral vein   I/Os:      Estimated blood loss less than 50cc     IV fluids:1400cc       Urine output 4600cc       Contrast Omnipaque 240    cc       Antibiotics: Ancef 2grams   Decadron 8mg     Vitals: /75       HR 77      [id number] % general anesthesia    Preliminary Report:  Using a 6 Mohawk short sheath to the right groin, and a 7 [place]se to left groin under general anesthesia  via left vertebral artery, right common carotid artery, right external carotid artery   right occipital, right ascending pharyngeal a selective cerebral angiography was performed and again demonstrates a right sigmoid sinus dural AVM. No change from past angiography.  Endovascular embolization  with a combined transarterial and transvenous approach.  Coils and onxy via venous approach and coils and micro particles via transarterial approach.  Complete angiographic obliteration with  preservation of the sinus.  Patient tolerated procedure well, hemodynamically stable, no change in neurological status compared to baseline.  Results discussed with neurosurgery, patient and patient's .  Bilateral Groin sheath were removed, manual compression held to hemostasis  for  21 minutes, no active bleeding, no hematoma, Avitene applied,  quick clot and safeguard balloon dressing   applied at 1415  STAT labs:  CBC BMP PTT 1430 hours and 1930 hours  Patient transferred to Neuro ICU Bed 6 Interventional Neuro- Radiology   Procedure Note PA-C    Procedure:      Selective Cerebral Angiography and embolization with 7 coils, Onxy 18  0.7cc and micro particles  1.0cc  Pre- Procedure Diagnosis: right sigmoid sinus dural AVM  Post- Procedure Diagnosis: right sigmoid sinus dural AVM    : Dr. Pasha Nicole     Physician Assistant:       Lois Morales PA-C  Nurse:                            Pauly Wells RN   Radiologic technologist: Chris D'Amico   LRT  Anesthesiologist:            Dr Justino Velazquez                general anesthesia      Sheath:  6 Salvadorean short sheath right femoral artery  7 Salvadorean short sheath left femoral vein                  5 Salvadorean short sheath right femoral vein   I/Os:      Estimated blood loss less than 50cc     IV fluids:1400cc       Urine output 4600cc       Contrast Omnipaque 240    cc       Antibiotics: Ancef 2grams   Decadron 8mg     Vitals: /75       HR 77      Spo2 100 % general anesthesia    Preliminary Report:  Using a 6 Bhutanese short sheath to the right groin, and a 7 Salvadorean to left groin under general anesthesia  via left vertebral artery, right common carotid artery, right external carotid artery   right occipital, right ascending pharyngeal a selective cerebral angiography was performed and again demonstrates a right sigmoid sinus dural AVM. No change from past angiography.  Endovascular embolization  with a combined transarterial and transvenous approach.  Coils and onxy via venous approach and coils and micro particles via transarterial approach.  Complete angiographic obliteration with  preservation of the sinus.  Patient tolerated procedure well, hemodynamically stable, no change in neurological status compared to baseline.  Results discussed with neurosurgery, patient and patient's .  Bilateral Groin sheath were removed, manual compression held to hemostasis  for  21 minutes, no active bleeding, no hematoma, Avitene applied,  quick clot and safeguard balloon dressing   applied at 1415  STAT labs:  CBC BMP PTT 1430 hours and 1930 hours  Patient transferred to Neuro ICU Bed 6

## 2017-06-21 NOTE — PATIENT PROFILE ADULT. - TOBACCO USE
Kerwin Mcdermott is a 48 y.o. female here for   Chief Complaint   Patient presents with    Diabetes     3 mo f/u       Functional glucose monitor and record keeping system? - yes  Eye exam within last year? - yes 1 yr ago  Foot exam within last year? - yes    Lab Results   Component Value Date/Time    Hemoglobin A1c 6.0 11/28/2016 11:24 AM       Wt Readings from Last 3 Encounters:   01/18/17 181 lb 12.8 oz (82.5 kg)   12/09/16 183 lb 3.2 oz (83.1 kg)   11/28/16 184 lb (83.5 kg)     Temp Readings from Last 3 Encounters:   01/18/17 98.2 °F (36.8 °C) (Oral)   12/09/16 99 °F (37.2 °C) (Oral)   11/28/16 98 °F (36.7 °C) (Oral)     BP Readings from Last 3 Encounters:   01/18/17 123/86   12/09/16 136/64   11/28/16 123/79     Pulse Readings from Last 3 Encounters:   01/18/17 90   12/09/16 (!) 109   11/28/16 65 Never smoker

## 2017-06-22 DIAGNOSIS — I67.1 CEREBRAL ANEURYSM, NONRUPTURED: ICD-10-CM

## 2017-06-22 LAB
ANION GAP SERPL CALC-SCNC: 14 MMOL/L — SIGNIFICANT CHANGE UP (ref 5–17)
BUN SERPL-MCNC: 8 MG/DL — SIGNIFICANT CHANGE UP (ref 7–23)
CALCIUM SERPL-MCNC: 8.7 MG/DL — SIGNIFICANT CHANGE UP (ref 8.4–10.5)
CHLORIDE SERPL-SCNC: 108 MMOL/L — SIGNIFICANT CHANGE UP (ref 96–108)
CO2 SERPL-SCNC: 17 MMOL/L — LOW (ref 22–31)
CREAT SERPL-MCNC: 0.55 MG/DL — SIGNIFICANT CHANGE UP (ref 0.5–1.3)
GLUCOSE SERPL-MCNC: 109 MG/DL — HIGH (ref 70–99)
HCT VFR BLD CALC: 35.6 % — SIGNIFICANT CHANGE UP (ref 34.5–45)
HGB BLD-MCNC: 12.3 G/DL — SIGNIFICANT CHANGE UP (ref 11.5–15.5)
MCHC RBC-ENTMCNC: 32 PG — SIGNIFICANT CHANGE UP (ref 27–34)
MCHC RBC-ENTMCNC: 34.6 GM/DL — SIGNIFICANT CHANGE UP (ref 32–36)
MCV RBC AUTO: 92.3 FL — SIGNIFICANT CHANGE UP (ref 80–100)
PLATELET # BLD AUTO: 195 K/UL — SIGNIFICANT CHANGE UP (ref 150–400)
POTASSIUM SERPL-MCNC: 4.3 MMOL/L — SIGNIFICANT CHANGE UP (ref 3.5–5.3)
POTASSIUM SERPL-SCNC: 4.3 MMOL/L — SIGNIFICANT CHANGE UP (ref 3.5–5.3)
RBC # BLD: 3.86 M/UL — SIGNIFICANT CHANGE UP (ref 3.8–5.2)
RBC # FLD: 11.2 % — SIGNIFICANT CHANGE UP (ref 10.3–14.5)
SODIUM SERPL-SCNC: 139 MMOL/L — SIGNIFICANT CHANGE UP (ref 135–145)
WBC # BLD: 11.5 K/UL — HIGH (ref 3.8–10.5)
WBC # FLD AUTO: 11.5 K/UL — HIGH (ref 3.8–10.5)

## 2017-06-22 PROCEDURE — 99233 SBSQ HOSP IP/OBS HIGH 50: CPT

## 2017-06-22 PROCEDURE — 70450 CT HEAD/BRAIN W/O DYE: CPT | Mod: 26

## 2017-06-22 RX ORDER — DEXAMETHASONE 0.5 MG/5ML
2 ELIXIR ORAL EVERY 12 HOURS
Qty: 0 | Refills: 0 | Status: DISCONTINUED | OUTPATIENT
Start: 2017-06-22 | End: 2017-06-24

## 2017-06-22 RX ORDER — DEXTROSE MONOHYDRATE, SODIUM CHLORIDE, AND POTASSIUM CHLORIDE 50; .745; 4.5 G/1000ML; G/1000ML; G/1000ML
1000 INJECTION, SOLUTION INTRAVENOUS
Qty: 0 | Refills: 0 | Status: DISCONTINUED | OUTPATIENT
Start: 2017-06-22 | End: 2017-06-22

## 2017-06-22 RX ORDER — LISINOPRIL 2.5 MG/1
2.5 TABLET ORAL DAILY
Qty: 0 | Refills: 0 | Status: DISCONTINUED | OUTPATIENT
Start: 2017-06-22 | End: 2017-06-24

## 2017-06-22 RX ORDER — DIAZEPAM 5 MG
2 TABLET ORAL EVERY 8 HOURS
Qty: 0 | Refills: 0 | Status: DISCONTINUED | OUTPATIENT
Start: 2017-06-22 | End: 2017-06-24

## 2017-06-22 RX ADMIN — LISINOPRIL 2.5 MILLIGRAM(S): 2.5 TABLET ORAL at 11:57

## 2017-06-22 RX ADMIN — HEPARIN SODIUM 5000 UNIT(S): 5000 INJECTION INTRAVENOUS; SUBCUTANEOUS at 14:55

## 2017-06-22 RX ADMIN — Medication 2 MILLIGRAM(S): at 05:08

## 2017-06-22 RX ADMIN — HEPARIN SODIUM 5000 UNIT(S): 5000 INJECTION INTRAVENOUS; SUBCUTANEOUS at 21:24

## 2017-06-22 RX ADMIN — Medication 2 MILLIGRAM(S): at 20:30

## 2017-06-22 RX ADMIN — Medication 40 MILLIEQUIVALENT(S): at 03:12

## 2017-06-22 RX ADMIN — HEPARIN SODIUM 5000 UNIT(S): 5000 INJECTION INTRAVENOUS; SUBCUTANEOUS at 05:08

## 2017-06-22 RX ADMIN — Medication 2 MILLIGRAM(S): at 11:58

## 2017-06-22 RX ADMIN — Medication 2 MILLIGRAM(S): at 17:48

## 2017-06-22 NOTE — PROGRESS NOTE ADULT - SUBJECTIVE AND OBJECTIVE BOX
SUMMARY:  46F h/o HTN p/w pulsation and swishing in right ear since November 2016 and was found to have a right dural arteriovenous fistula. She underwent angio with embolization (coils, [place], microparticles) on 6/21/17.     OVERNIGHT EVENTS:   Afebrile    REVIEW OF SYSTEMS:  No headache or weakness    DEVICES:   [x] Peripheral IVs, a-line/villavicencio d/c'd    VITALS/LABS/DATA/MEDICATIONS: [x] Reviewed    EXAMINATION:  General: No acute distress  HEENT: Anicteric sclerae  Cardiac: [id number]  Lungs: Clear  Abdomen: Soft, non-tender, +BS  Extremities: No c/c/e  Skin/Incision Site: Clean, dry and intact  Neurologic: Awake, alert, fully oriented, follows commands, PERRL, VFFtc, EOMI, face symmetric, tongue midline, no drift, full strength SUMMARY:  46F h/o HTN p/w pulsation and swishing in right ear since November 2016 and was found to have a right dural arteriovenous fistula. She underwent angio with embolization (coils, [place], microparticles) on 6/21/17.     OVERNIGHT EVENTS:   Afebrile    REVIEW OF SYSTEMS:  No headache or weakness    DEVICES:   [x] Peripheral IVs, a-line/villavicencio d/c'd    VITALS/LABS/DATA/MEDICATIONS: [x] Reviewed    EXAMINATION:  General: No acute distress  HEENT: Anicteric sclerae  Cardiac: [id number]  Lungs: Clear  Abdomen: Soft, non-tender, +BS  Extremities: No c/c/e, good distal pulses   Skin/Incision Site: Clean, dry and intact, groins without hematoma  Neurologic: Awake, alert, fully oriented, follows commands, PERRL, VFFtc, EOMI, face symmetric, tongue midline, no drift, full strength

## 2017-06-22 NOTE — PROGRESS NOTE ADULT - ASSESSMENT
ASSESSMENT/PLAN: post-operative day 1 from angio/ambo of right dAVF    NEURO:  Steroid taper for cerebral edema associated with embolization agents  Pain control  Activity: [x] mobilize as tolerated [] Bedrest [] PT [] OT [] PMNR    PULM:  Incentive spirometry    CV:  SBP goal 100-150mmHg  Resume home HTN meds    RENAL:  Fluids: IVL    GI:  Diet: Advance as tolerated  GI prophylaxis [x] not indicated [] PPI [] other:  Bowel regimen [x] colace [x] senna [] other:    ENDO:   Goal euglycemia (-180)    HEME/ONC:  VTE prophylaxis: [x] SCDs [x] chemoprophylaxis [] hold chemoprophylaxis due to: [] high risk of DVT/PE on admission due to:    ID:  Afebrile    SOCIAL/FAMILY:  [x] awaiting [] updated at bedside [] family meeting    CODE STATUS:  [x] Full Code [] DNR [] DNI [] Palliative/Comfort Care    DISPOSITION:  [] ICU [] Stroke Unit [x] Floor [] EMU [] RCU [] PCU    Time seen: 9:15AM  Time spent: 35 minutes    Contact: [phone]

## 2017-06-22 NOTE — PROGRESS NOTE ADULT - SUBJECTIVE AND OBJECTIVE BOX
Visit Summary: 46y Female POD# 1 s/p angio/embo of R dural AVF. Patient tolerated the procedure well and remains neurologically intact.    Overnight Events: No acute events o/n.    Exam:  T(C): 36.8, Max: 36.9 (06-21 @ 19:00)  HR: 83 (75 - 92)  BP: 124/85 (115/75 - 127/90)  RR: 21 (15 - 23)  SpO2: 99% (99% - 100%)  Wt(kg): --    AAOx3, EOS, FC  PERRL, EOMI  Face symmetric, tongue midline  RAMIREZ 5/5, no drift  SILT throughout                        13.1   8.6   )-----------( 179      ( 21 Jun 2017 19:05 )             35.8     06-21    143  |  109<H>  |  8   ----------------------------<  151<H>  3.4<L>   |  17<L>  |  0.60    Ca    7.8<L>      21 Jun 2017 19:05  Phos  3.6     06-21  Mg     1.4     06-21    PT/INR - ( 21 Jun 2017 15:14 )   PT: 14.4 sec;   INR: 1.33 ratio         PTT - ( 21 Jun 2017 15:14 )  PTT:> 200 sec

## 2017-06-23 DIAGNOSIS — I10 ESSENTIAL (PRIMARY) HYPERTENSION: ICD-10-CM

## 2017-06-23 LAB
ANION GAP SERPL CALC-SCNC: 15 MMOL/L — SIGNIFICANT CHANGE UP (ref 5–17)
BUN SERPL-MCNC: 12 MG/DL — SIGNIFICANT CHANGE UP (ref 7–23)
CALCIUM SERPL-MCNC: 8.9 MG/DL — SIGNIFICANT CHANGE UP (ref 8.4–10.5)
CHLORIDE SERPL-SCNC: 105 MMOL/L — SIGNIFICANT CHANGE UP (ref 96–108)
CO2 SERPL-SCNC: 19 MMOL/L — LOW (ref 22–31)
CREAT SERPL-MCNC: 0.74 MG/DL — SIGNIFICANT CHANGE UP (ref 0.5–1.3)
GLUCOSE SERPL-MCNC: 102 MG/DL — HIGH (ref 70–99)
HCT VFR BLD CALC: 35.2 % — SIGNIFICANT CHANGE UP (ref 34.5–45)
HGB BLD-MCNC: 12.8 G/DL — SIGNIFICANT CHANGE UP (ref 11.5–15.5)
MCHC RBC-ENTMCNC: 34.2 PG — HIGH (ref 27–34)
MCHC RBC-ENTMCNC: 36.4 GM/DL — HIGH (ref 32–36)
MCV RBC AUTO: 93.9 FL — SIGNIFICANT CHANGE UP (ref 80–100)
PLATELET # BLD AUTO: 210 K/UL — SIGNIFICANT CHANGE UP (ref 150–400)
POTASSIUM SERPL-MCNC: 3.8 MMOL/L — SIGNIFICANT CHANGE UP (ref 3.5–5.3)
POTASSIUM SERPL-SCNC: 3.8 MMOL/L — SIGNIFICANT CHANGE UP (ref 3.5–5.3)
RBC # BLD: 3.75 M/UL — LOW (ref 3.8–5.2)
RBC # FLD: 11.3 % — SIGNIFICANT CHANGE UP (ref 10.3–14.5)
SODIUM SERPL-SCNC: 139 MMOL/L — SIGNIFICANT CHANGE UP (ref 135–145)
WBC # BLD: 8 K/UL — SIGNIFICANT CHANGE UP (ref 3.8–10.5)
WBC # FLD AUTO: 8 K/UL — SIGNIFICANT CHANGE UP (ref 3.8–10.5)

## 2017-06-23 PROCEDURE — 99223 1ST HOSP IP/OBS HIGH 75: CPT

## 2017-06-23 RX ADMIN — FAMOTIDINE 20 MILLIGRAM(S): 10 INJECTION INTRAVENOUS at 14:13

## 2017-06-23 RX ADMIN — Medication 2 MILLIGRAM(S): at 05:44

## 2017-06-23 RX ADMIN — Medication 2 MILLIGRAM(S): at 18:34

## 2017-06-23 RX ADMIN — HEPARIN SODIUM 5000 UNIT(S): 5000 INJECTION INTRAVENOUS; SUBCUTANEOUS at 05:44

## 2017-06-23 RX ADMIN — HEPARIN SODIUM 5000 UNIT(S): 5000 INJECTION INTRAVENOUS; SUBCUTANEOUS at 14:13

## 2017-06-23 RX ADMIN — HEPARIN SODIUM 5000 UNIT(S): 5000 INJECTION INTRAVENOUS; SUBCUTANEOUS at 21:45

## 2017-06-23 RX ADMIN — Medication 2 MILLIGRAM(S): at 21:45

## 2017-06-23 RX ADMIN — LISINOPRIL 2.5 MILLIGRAM(S): 2.5 TABLET ORAL at 05:44

## 2017-06-23 NOTE — CONSULT NOTE ADULT - NEUROLOGICAL DETAILS
normal strength/sensation intact/alert and oriented x 3/responds to verbal commands/cranial nerves intact

## 2017-06-23 NOTE — CONSULT NOTE ADULT - SUBJECTIVE AND OBJECTIVE BOX
CC: presented for embolization of dural AV fistula    HPI: 47 yo F c hx as below s/p embolization of dural AV fistula, developed paresthesias in b/l hand and lower legs, now resolved. Pt also had some right neck pain, now resolved. No f/c/r. No n/v/d.      PAST MEDICAL & SURGICAL HISTORY:  Dural arteriovenous fistula  HTN (hypertension)  Hypercholesterolemia: no meds  Hiatal hernia  H/O umbilical hernia repair  S/P inguinal hernia repair: 1974  S/P D&amp;C: 2000- miscarriage        Allergies    No Known Allergies      Social History:  no tobacco or ETOH     FAMILY HISTORY:  No pertinent family history in first degree relatives      MEDICATIONS  (STANDING):  famotidine    Tablet 20milliGRAM(s) Oral daily  heparin  Injectable 5000Unit(s) SubCutaneous every 8 hours  dexamethasone     Tablet 2milliGRAM(s) Oral every 12 hours  lisinopril 2.5milliGRAM(s) Oral daily    MEDICATIONS  (PRN):  diazepam    Tablet 2milliGRAM(s) Oral every 8 hours PRN muscle spasm      Vital Signs Last 24 Hrs  T(C): 36.9, Max: 37.1 (06-22 @ 20:23)  HR: 88 (64 - 88)  BP: 123/85 (112/65 - 127/77)  RR: 18 (16 - 18)  SpO2: 98% (96% - 99%)  Wt(kg): --  CAPILLARY BLOOD GLUCOSE    I&O's Summary  I & Os for 24h ending 23 Jun 2017 07:00  =============================================  IN: 400 ml / OUT: 1225 ml / NET: -825 ml    I & Os for current day (as of 23 Jun 2017 17:03)  =============================================  IN: 420 ml / OUT: 0 ml / NET: 420 ml        LABS:                        12.8   8.0   )-----------( 210      ( 23 Jun 2017 06:05 )             35.2     06-23    139  |  105  |  12  ----------------------------<  102<H>  3.8   |  19<L>  |  0.74    Ca    8.9      23 Jun 2017 06:05  Phos  3.6     06-21  Mg     1.4     06-21                RADIOLOGY & ADDITIONAL TESTS:    Imaging Personally Reviewed:    Consultant(s) Notes Reviewed:      Care Discussed with Consultants/Other Providers: neurosurg

## 2017-06-23 NOTE — PROGRESS NOTE ADULT - PROBLEM SELECTOR PLAN 1
Post #2 s/p embolization of A-V fistula  Neurologically stable  Ambulate as tolerated  D/c plan as per Dr Nicole
q1 neuro checks  CTH in am  pain control

## 2017-06-23 NOTE — CONSULT NOTE ADULT - MUSCULOSKELETAL
negative detailed exam no joint warmth/no joint swelling/no joint erythema/normal strength/no calf tenderness

## 2017-06-23 NOTE — PROGRESS NOTE ADULT - SUBJECTIVE AND OBJECTIVE BOX
HPI:  46F h/o HTN p/w pulsation and swishing in right ear since November 2016 and was found to have a right dural arteriovenous fistula. She underwent angio with embolization (coils, [place], microparticles) on 6/21/17.     Overnight events: none    Vital Signs Last 24 Hrs  T(C): 36.9, Max: 37.1 (06-22 @ 20:23)  T(F): 98.4, Max: 98.8 (06-22 @ 20:23)  HR: 78 (64 - 86)  BP: 116/76 (112/65 - 127/77)  BP(mean): 89 (89 - 89)  RR: 18 (15 - 20)  SpO2: 97% (96% - 100%)                          12.8   8.0   )-----------( 210      ( 23 Jun 2017 06:05 )             35.2    06-23    139  |  105  |  12  ----------------------------<  102<H>  3.8   |  19<L>  |  0.74    Ca    8.9      23 Jun 2017 06:05  Phos  3.6     06-21  Mg     1.4     06-21    PT/INR - ( 21 Jun 2017 15:14 )   PT: 14.4 sec;   INR: 1.33 ratio         PTT - ( 21 Jun 2017 15:14 )  PTT:> 200 sec   Stroke Core Measures      DRAIN OUTPUT:     NEUROIMAGING:     PHYSICAL EXAM:    General: No Acute Distress     Neurological: Awake, alert oriented to person, place and time, Following Commands, PERRL, EOMI, Face Symmetrical, Speech Fluent, Moving all extremities, Muscle Strength normal in all four extremities, No Drift, Sensation to Light Touch Intact    Pulmonary: Clear to Auscultation, No Rales, No Rhonchi, No Wheezes     Cardiovascular: S1, S2, Regular Rate and Rhythm     Gastrointestinal: Soft, Nontender, Nondistended     Extremities: No calf tenderness     Incision: Rt Groin intact    MEDICATIONS:   Antibiotics:    Neuro:  diazepam    Tablet 2milliGRAM(s) Oral every 8 hours PRN muscle spasm    Anticoagulation:  heparin  Injectable 5000Unit(s) SubCutaneous every 8 hours    Cardiology:  lisinopril 2.5milliGRAM(s) Oral daily    Endo:   dexamethasone     Tablet 2milliGRAM(s) Oral every 12 hours    Pulm:    GI/:  famotidine    Tablet 20milliGRAM(s) Oral daily    Other:

## 2017-06-23 NOTE — CONSULT NOTE ADULT - RS GEN PE MLT RESP DETAILS PC
breath sounds equal/good air movement/no wheezes/no rales/respirations non-labored/clear to auscultation bilaterally/no rhonchi

## 2017-06-24 VITALS
DIASTOLIC BLOOD PRESSURE: 78 MMHG | SYSTOLIC BLOOD PRESSURE: 117 MMHG | OXYGEN SATURATION: 98 % | TEMPERATURE: 99 F | HEART RATE: 80 BPM | RESPIRATION RATE: 18 BRPM

## 2017-06-24 LAB
ANION GAP SERPL CALC-SCNC: 15 MMOL/L — SIGNIFICANT CHANGE UP (ref 5–17)
BUN SERPL-MCNC: 16 MG/DL — SIGNIFICANT CHANGE UP (ref 7–23)
CALCIUM SERPL-MCNC: 9.9 MG/DL — SIGNIFICANT CHANGE UP (ref 8.4–10.5)
CHLORIDE SERPL-SCNC: 103 MMOL/L — SIGNIFICANT CHANGE UP (ref 96–108)
CO2 SERPL-SCNC: 20 MMOL/L — LOW (ref 22–31)
CREAT SERPL-MCNC: 0.79 MG/DL — SIGNIFICANT CHANGE UP (ref 0.5–1.3)
GLUCOSE SERPL-MCNC: 98 MG/DL — SIGNIFICANT CHANGE UP (ref 70–99)
HCT VFR BLD CALC: 46.5 % — HIGH (ref 34.5–45)
HGB BLD-MCNC: 15.2 G/DL — SIGNIFICANT CHANGE UP (ref 11.5–15.5)
MCHC RBC-ENTMCNC: 30.5 PG — SIGNIFICANT CHANGE UP (ref 27–34)
MCHC RBC-ENTMCNC: 32.7 GM/DL — SIGNIFICANT CHANGE UP (ref 32–36)
MCV RBC AUTO: 93.3 FL — SIGNIFICANT CHANGE UP (ref 80–100)
PLATELET # BLD AUTO: 247 K/UL — SIGNIFICANT CHANGE UP (ref 150–400)
POTASSIUM SERPL-MCNC: 4.4 MMOL/L — SIGNIFICANT CHANGE UP (ref 3.5–5.3)
POTASSIUM SERPL-SCNC: 4.4 MMOL/L — SIGNIFICANT CHANGE UP (ref 3.5–5.3)
RBC # BLD: 4.98 M/UL — SIGNIFICANT CHANGE UP (ref 3.8–5.2)
RBC # FLD: 11.3 % — SIGNIFICANT CHANGE UP (ref 10.3–14.5)
SODIUM SERPL-SCNC: 138 MMOL/L — SIGNIFICANT CHANGE UP (ref 135–145)
WBC # BLD: 8.6 K/UL — SIGNIFICANT CHANGE UP (ref 3.8–10.5)
WBC # FLD AUTO: 8.6 K/UL — SIGNIFICANT CHANGE UP (ref 3.8–10.5)

## 2017-06-24 PROCEDURE — 61626 TCAT PERM OCCLS/EMBOL NONCNS: CPT

## 2017-06-24 PROCEDURE — 36227 PLACE CATH XTRNL CAROTID: CPT

## 2017-06-24 PROCEDURE — 84100 ASSAY OF PHOSPHORUS: CPT

## 2017-06-24 PROCEDURE — 36226 PLACE CATH VERTEBRAL ART: CPT

## 2017-06-24 PROCEDURE — 36012 PLACE CATHETER IN VEIN: CPT

## 2017-06-24 PROCEDURE — 85730 THROMBOPLASTIN TIME PARTIAL: CPT

## 2017-06-24 PROCEDURE — 85027 COMPLETE CBC AUTOMATED: CPT

## 2017-06-24 PROCEDURE — 75898 FOLLOW-UP ANGIOGRAPHY: CPT

## 2017-06-24 PROCEDURE — C1769: CPT

## 2017-06-24 PROCEDURE — 75894 X-RAYS TRANSCATH THERAPY: CPT

## 2017-06-24 PROCEDURE — 83735 ASSAY OF MAGNESIUM: CPT

## 2017-06-24 PROCEDURE — C1894: CPT

## 2017-06-24 PROCEDURE — C1889: CPT

## 2017-06-24 PROCEDURE — 86850 RBC ANTIBODY SCREEN: CPT

## 2017-06-24 PROCEDURE — 86901 BLOOD TYPING SEROLOGIC RH(D): CPT

## 2017-06-24 PROCEDURE — C2628: CPT

## 2017-06-24 PROCEDURE — 85610 PROTHROMBIN TIME: CPT

## 2017-06-24 PROCEDURE — 36223 PLACE CATH CAROTID/INOM ART: CPT

## 2017-06-24 PROCEDURE — C1887: CPT

## 2017-06-24 PROCEDURE — 80048 BASIC METABOLIC PNL TOTAL CA: CPT

## 2017-06-24 PROCEDURE — C1760: CPT

## 2017-06-24 PROCEDURE — 86900 BLOOD TYPING SEROLOGIC ABO: CPT

## 2017-06-24 PROCEDURE — 70450 CT HEAD/BRAIN W/O DYE: CPT

## 2017-06-24 RX ORDER — CYCLOBENZAPRINE HYDROCHLORIDE 10 MG/1
1 TABLET, FILM COATED ORAL
Qty: 21 | Refills: 0
Start: 2017-06-24 | End: 2017-07-01

## 2017-06-24 RX ORDER — DEXAMETHASONE 0.5 MG/5ML
1 ELIXIR ORAL
Qty: 4 | Refills: 0
Start: 2017-06-24 | End: 2017-06-26

## 2017-06-24 RX ORDER — ACETAMINOPHEN 500 MG
2 TABLET ORAL
Qty: 56 | Refills: 0
Start: 2017-06-24 | End: 2017-07-01

## 2017-06-24 RX ORDER — CYCLOBENZAPRINE HYDROCHLORIDE 10 MG/1
10 TABLET, FILM COATED ORAL THREE TIMES A DAY
Qty: 0 | Refills: 0 | Status: DISCONTINUED | OUTPATIENT
Start: 2017-06-24 | End: 2017-06-24

## 2017-06-24 RX ADMIN — HEPARIN SODIUM 5000 UNIT(S): 5000 INJECTION INTRAVENOUS; SUBCUTANEOUS at 06:52

## 2017-06-24 RX ADMIN — Medication 2 MILLIGRAM(S): at 06:52

## 2017-06-24 RX ADMIN — LISINOPRIL 2.5 MILLIGRAM(S): 2.5 TABLET ORAL at 12:05

## 2017-06-24 RX ADMIN — FAMOTIDINE 20 MILLIGRAM(S): 10 INJECTION INTRAVENOUS at 12:06

## 2017-06-24 NOTE — DISCHARGE NOTE ADULT - CARE PLAN
Principal Discharge DX:	Dural arteriovenous fistula  Goal:	pain control  Instructions for follow-up, activity and diet:	Call Neurosurgery Dr NIKKO Nicole for appointment in 1 week. (his office will call you on Monday to set up an appointment for Tues or Wed).  Followup with your Private MD in 1-2 weeks.  Return to Emergency Department or contact your Neurosurgeon if any changes in mental status, weakness, numbness or tingling of extremities; difficulty swallowing; drainage or redness of wound, fever; pain in legs; difficulty urinating or constipation.  Donot restart your Aspirin or take any Motrin/NSAIDS until checking with your Neurosurgeon.  Instructions for follow-up, activity and diet:	No strenous activity. No heavy lifting. Do not return to work until cleared by physician. No driving until cleared by physician.  You may shower on the 3rd day after you surgery.  No soaking in tub,  Donot apply any ointements to incision.

## 2017-06-24 NOTE — DISCHARGE NOTE ADULT - PATIENT PORTAL LINK FT
“You can access the FollowHealth Patient Portal, offered by [facility], by registering with the following website: [email or web address]”

## 2017-06-24 NOTE — DISCHARGE NOTE ADULT - HOSPITAL COURSE
The patient was seen in PST on 5/11/2017 and admitted via SDA on 6/21/2017 and taken to the Angio Suite this same day.  Postop course was uneventful.  The patient was on Ancef, SQH, and Decadron for routine postop management. She will cont decadron 2mg Q12hr x 2 more days.    He was followed by Dr NIKKO Nicole neuro interventionalist and will call pt on Mon to sched an appt for FU on Tues/Wed.    The patient was ambulating independently and no PT cons was needed.  She was subsequently DC on 6/24/2017 in stable condition.

## 2017-06-24 NOTE — DISCHARGE NOTE ADULT - MEDICATION SUMMARY - MEDICATIONS TO TAKE
I will START or STAY ON the medications listed below when I get home from the hospital:    dexamethasone 2 mg oral tablet  -- 1 tab(s) by mouth every 12 hours  -- Indication: For swelling    Tylenol 325 mg oral tablet  -- 2 tab(s) by mouth every 6 hours as needed for pain  -- This product contains acetaminophen.  Do not use  with any other product containing acetaminophen to prevent possible liver damage.    -- Indication: For pain    lisinopril 2.5 mg oral tablet  -- 1 tab(s) by mouth once a day  -- Indication: For HTN (hypertension)    cyclobenzaprine 10 mg oral tablet  -- 1 tab(s) by mouth 3 times a day, As needed, Muscle Spasm  -- Indication: For muscle pelaxer    Probiotic Formula oral capsule  -- 1 cap(s) by mouth once a day  -- Indication: For supplement    Vitamin D3 2000 intl units oral tablet  -- 1 tab(s) by mouth once a day  -- Indication: For supplement

## 2017-06-24 NOTE — PROGRESS NOTE ADULT - ASSESSMENT
46F h/o HTN p/w pulsation and swishing in right ear since November 2016 and was found to have a right dural arteriovenous fistula. She underwent angio with embolization (coils, [place], microparticles) on 6/21/17.    PROCEDURE:  Adm 6/21 S/P Angio and Embo rt dural AVF with coil, lien and micro particles  POD#3    PLAN:  Neuro: DC Home today. Dr Nicole office to [facility] pt [place] for appt Tues/Wed. Dc Valium to Flexeril. Con Decadron 2mg Q12hrs x 2 more days.   Hosp/Med-HTN (hypertension).  Recommendation: cont current tx.     Respiratory: Patient instructed to use incentive spirometer [X ] YES [ ] NO  DVT ppx: [ ] SQL [X ] SQH and Venodynes [ ] Left [ ] Right [ ] Bilateral  Discharge planning: No PT eval needed, pt independent

## 2017-06-24 NOTE — PROGRESS NOTE ADULT - SUBJECTIVE AND OBJECTIVE BOX
SUBJECTIVE: Comfortable, some Rt neck pain. Amb in hallways independently    OVERNIGHT EVENTS: None    Vital Signs Last 24 Hrs  T(C): 37, Max: 37.1 (06-23 @ 21:45)  T(F): 98.6, Max: 98.7 (06-23 @ 21:45)  HR: 69 (69 - 88)  BP: 107/67 (107/67 - 151/83)  BP(mean): --  RR: 18 (18 - 18)  SpO2: 98% (97% - 98%)  IVF: [X ] IVL [ ] NS+K@   DIET: [ X] Regular [ ] CCD [ ] Renal [ ] Puree [ ] Dysphagia [ ] Tube Feeds:   PCA: [ ] YES [X ] NO   [place]: [ ] YES [ ] NO [ X] VOID   BM: [ ] YES [ ] NO     DRAINS: n/a    PHYSICAL EXAM:    General: No Acute Distress     Neurological: Awake, alert oriented to person, place and time, Following Commands, PERRL, EOMI, Face Symmetrical, Speech Fluent, Moving all extremities, Muscle Strength normal in all four extremities, No Drift, Sensation to Light Touch Intact    Pulmonary: Clear to Auscultation, No Rales, No Rhonchi, No Wheezes     Cardiovascular: S1, S2, Regular Rate and Rhythm     Gastrointestinal: Soft, Nontender, Nondistended     Extremities: No calf tenderness     Incision: Rt groinn-CDI, Flat. Rt wrist A. line site sl ecchy, np pus/drainage, flat    LABS:                         15.2   8.6   )-----------( 247      ( 24 Jun 2017 07:24 )             46.5    06-24    138  |  103  |  16  ----------------------------<  98  4.4   |  20<L>  |  0.79    Ca    9.9      24 Jun 2017 07:24    Image: 6/22 CT:  Embolic material identified as described above.      I & Os for current day (as of 06-24 @ 08:50)  =============================================  IN: 880 ml / OUT: 0 ml / NET: 880 ml    IMAGING:     MEDICATIONS  (STANDING):  famotidine    Tablet 20milliGRAM(s) Oral daily  heparin  Injectable 5000Unit(s) SubCutaneous every 8 hours  dexamethasone     Tablet 2milliGRAM(s) Oral every 12 hours  lisinopril 2.5milliGRAM(s) Oral daily    MEDICATIONS  (PRN):  diazepam    Tablet 2milliGRAM(s) Oral every 8 hours PRN muscle spasm

## 2017-06-24 NOTE — DISCHARGE NOTE ADULT - PLAN OF CARE
pain control Call Neurosurgery Dr NIKKO Nicole for appointment in 1 week. (his office will call you on Monday to set up an appointment for Tues or Wed).  Followup with your Private MD in 1-2 weeks.  Return to Emergency Department or contact your Neurosurgeon if any changes in mental status, weakness, numbness or tingling of extremities; difficulty swallowing; drainage or redness of wound, fever; pain in legs; difficulty urinating or constipation.  Donot restart your Aspirin or take any Motrin/NSAIDS until checking with your Neurosurgeon. No strenous activity. No heavy lifting. Do not return to work until cleared by physician. No driving until cleared by physician.  You may shower on the 3rd day after you surgery.  No soaking in tub,  Donot apply any ointements to incision.

## 2017-06-24 NOTE — DISCHARGE NOTE ADULT - NS AS ACTIVITY OBS
Do not drive or operate machinery/Do not make important decisions/Showering allowed/No Heavy lifting/straining/Walking-Outdoors allowed/Stairs allowed/Bathing allowed/Walking-Indoors allowed

## 2017-06-24 NOTE — DISCHARGE NOTE ADULT - CARE PROVIDER_API CALL
Pasha Nicole), Neurosurgery  [address] [address]  Phone: [phone]  Fax: [phone]    Hussain Lamb), Neurological Surgery; Pediatric Neurological Surgery  [address] [place] [address]  Phone: [phone]  Fax: [phone]

## 2017-06-24 NOTE — DISCHARGE NOTE ADULT - CARE PROVIDERS DIRECT ADDRESSES
[email or web address]

## 2017-06-28 ENCOUNTER — APPOINTMENT (OUTPATIENT)
Dept: NEUROSURGERY | Facility: CLINIC | Age: 47
End: 2017-06-28

## 2017-06-28 ENCOUNTER — OTHER (OUTPATIENT)
Age: 47
End: 2017-06-28

## 2017-06-28 DIAGNOSIS — R42 DIZZINESS AND GIDDINESS: ICD-10-CM

## 2017-06-28 DIAGNOSIS — Z87.891 PERSONAL HISTORY OF NICOTINE DEPENDENCE: ICD-10-CM

## 2017-06-28 DIAGNOSIS — Z78.9 OTHER SPECIFIED HEALTH STATUS: ICD-10-CM

## 2017-06-28 DIAGNOSIS — Q27.30 ARTERIOVENOUS MALFORMATION, SITE UNSPECIFIED: ICD-10-CM

## 2017-06-28 DIAGNOSIS — R55 SYNCOPE AND COLLAPSE: ICD-10-CM

## 2017-07-03 ENCOUNTER — APPOINTMENT (OUTPATIENT)
Dept: OTOLARYNGOLOGY | Facility: CLINIC | Age: 47
End: 2017-07-03

## 2017-09-06 ENCOUNTER — OTHER (OUTPATIENT)
Age: 47
End: 2017-09-06

## 2017-09-08 ENCOUNTER — APPOINTMENT (OUTPATIENT)
Dept: ULTRASOUND IMAGING | Facility: HOSPITAL | Age: 47
End: 2017-09-08
Payer: COMMERCIAL

## 2017-09-08 ENCOUNTER — OUTPATIENT (OUTPATIENT)
Dept: OUTPATIENT SERVICES | Facility: HOSPITAL | Age: 47
LOS: 1 days | End: 2017-09-08
Payer: COMMERCIAL

## 2017-09-08 DIAGNOSIS — Z98.890 OTHER SPECIFIED POSTPROCEDURAL STATES: Chronic | ICD-10-CM

## 2017-09-08 PROCEDURE — 76536 US EXAM OF HEAD AND NECK: CPT | Mod: 26

## 2017-09-08 PROCEDURE — 76536 US EXAM OF HEAD AND NECK: CPT

## 2017-10-12 ENCOUNTER — FORM ENCOUNTER (OUTPATIENT)
Age: 47
End: 2017-10-12

## 2017-10-13 ENCOUNTER — APPOINTMENT (OUTPATIENT)
Dept: MRI IMAGING | Facility: HOSPITAL | Age: 47
End: 2017-10-13

## 2017-10-13 ENCOUNTER — OUTPATIENT (OUTPATIENT)
Dept: OUTPATIENT SERVICES | Facility: HOSPITAL | Age: 47
LOS: 1 days | End: 2017-10-13
Payer: COMMERCIAL

## 2017-10-13 DIAGNOSIS — Z98.890 OTHER SPECIFIED POSTPROCEDURAL STATES: Chronic | ICD-10-CM

## 2017-10-13 DIAGNOSIS — Q27.30 ARTERIOVENOUS MALFORMATION, SITE UNSPECIFIED: ICD-10-CM

## 2017-10-13 PROCEDURE — A9585: CPT

## 2017-10-13 PROCEDURE — 70553 MRI BRAIN STEM W/O & W/DYE: CPT | Mod: 26

## 2017-10-13 PROCEDURE — 70553 MRI BRAIN STEM W/O & W/DYE: CPT

## 2017-11-06 ENCOUNTER — OUTPATIENT (OUTPATIENT)
Dept: OUTPATIENT SERVICES | Facility: HOSPITAL | Age: 47
LOS: 1 days | End: 2017-11-06
Payer: COMMERCIAL

## 2017-11-06 ENCOUNTER — APPOINTMENT (OUTPATIENT)
Dept: MAMMOGRAPHY | Facility: HOSPITAL | Age: 47
End: 2017-11-06
Payer: COMMERCIAL

## 2017-11-06 DIAGNOSIS — Z98.890 OTHER SPECIFIED POSTPROCEDURAL STATES: Chronic | ICD-10-CM

## 2017-11-06 PROCEDURE — G0202: CPT | Mod: 26

## 2017-11-06 PROCEDURE — 77063 BREAST TOMOSYNTHESIS BI: CPT

## 2017-11-06 PROCEDURE — 77067 SCR MAMMO BI INCL CAD: CPT

## 2017-11-06 PROCEDURE — 77063 BREAST TOMOSYNTHESIS BI: CPT | Mod: 26

## 2018-03-23 ENCOUNTER — OTHER (OUTPATIENT)
Age: 48
End: 2018-03-23

## 2019-03-05 ENCOUNTER — TRANSCRIPTION ENCOUNTER (OUTPATIENT)
Age: 49
End: 2019-03-05

## 2019-03-27 NOTE — H&P PST ADULT - [facility] MLT [facility]
Dear Jamila Calhoun,     We would like to welcome you to our practice and wish you a happy and healthy pregnancy and birth experience! We believe that this is a special time in your life and this is why we offer our patients and their families a variety of prenatal options. Our practice consists of 10 OB/GYN physicians, 4 certified nurse midwives, along with 5 nurse practitioners.       Your health and pregnancy history will be reviewed so that your care can be coordinated with the physicians and midwives. The physicians and midwives at [facility] will work together to provide you with the highest quality of care. All providers are on staff and will deliver your baby at the [facility].     During your pregnancy, you may also receive care from one of our nurse practitioners. They are educated in the care of pregnant as well as non-pregnant women, and are happy to answer any questions you may have about pregnancy and the birth experience.     Billing for your pregnancy is a global fee, which includes all routine prenatal visits, the delivery and postpartum visit. Lab tests, ultrasounds, non-stress test, non-routine pregnancy visits and your hospital stay are not included in the global fee. We recommend that you check with your insurance company for your maternity benefits.     If you have any questions or concerns, please do not hesitate to contact us at either of our locations, [place] [phone] or [place] [phone]. Once again, congratulations!  We hope to make your pregnancy and birth of your child a happy and satisfying experience.    Sincerely,    Dr. Felipe Cueva, POLLO [place], POLLO Payne, LESTER  Lavern Mendez, POLLO Og, NATALIE Palomino,  NP  Taisha Hernandez, NP  Pam Roman, NP  Maria Teresa Pfeiffer, NP      no murmur/regular rate and rhythm

## 2019-05-03 PROBLEM — I10 ESSENTIAL (PRIMARY) HYPERTENSION: Chronic | Status: ACTIVE | Noted: 2017-05-11

## 2019-05-03 PROBLEM — I67.1 CEREBRAL ANEURYSM, NONRUPTURED: Chronic | Status: ACTIVE | Noted: 2017-05-11

## 2019-06-21 ENCOUNTER — OUTPATIENT (OUTPATIENT)
Dept: OUTPATIENT SERVICES | Facility: HOSPITAL | Age: 49
LOS: 1 days | End: 2019-06-21
Payer: COMMERCIAL

## 2019-06-21 ENCOUNTER — APPOINTMENT (OUTPATIENT)
Dept: MAMMOGRAPHY | Facility: HOSPITAL | Age: 49
End: 2019-06-21
Payer: COMMERCIAL

## 2019-06-21 ENCOUNTER — APPOINTMENT (OUTPATIENT)
Dept: ULTRASOUND IMAGING | Facility: HOSPITAL | Age: 49
End: 2019-06-21
Payer: COMMERCIAL

## 2019-06-21 DIAGNOSIS — Z98.890 OTHER SPECIFIED POSTPROCEDURAL STATES: Chronic | ICD-10-CM

## 2019-06-21 DIAGNOSIS — Z12.31 ENCOUNTER FOR SCREENING MAMMOGRAM FOR MALIGNANT NEOPLASM OF BREAST: ICD-10-CM

## 2019-06-21 PROCEDURE — 77063 BREAST TOMOSYNTHESIS BI: CPT | Mod: 26

## 2019-06-21 PROCEDURE — 77067 SCR MAMMO BI INCL CAD: CPT | Mod: 26

## 2019-06-21 PROCEDURE — 77063 BREAST TOMOSYNTHESIS BI: CPT

## 2019-06-21 PROCEDURE — 76641 ULTRASOUND BREAST COMPLETE: CPT | Mod: 26,50

## 2019-06-21 PROCEDURE — 76641 ULTRASOUND BREAST COMPLETE: CPT

## 2019-06-21 PROCEDURE — 77067 SCR MAMMO BI INCL CAD: CPT

## 2021-02-02 ENCOUNTER — EMERGENCY (EMERGENCY)
Facility: HOSPITAL | Age: 51
LOS: 1 days | Discharge: HOME | End: 2021-02-02
Attending: INTERNAL MEDICINE | Admitting: INTERNAL MEDICINE
Payer: COMMERCIAL

## 2021-02-02 VITALS
WEIGHT: 169.98 LBS | HEART RATE: 72 BPM | DIASTOLIC BLOOD PRESSURE: 85 MMHG | RESPIRATION RATE: 17 BRPM | SYSTOLIC BLOOD PRESSURE: 130 MMHG | TEMPERATURE: 98 F | HEIGHT: 68.5 IN | OXYGEN SATURATION: 99 %

## 2021-02-02 DIAGNOSIS — Z98.890 OTHER SPECIFIED POSTPROCEDURAL STATES: Chronic | ICD-10-CM

## 2021-02-02 LAB
ALBUMIN SERPL ELPH-MCNC: 4 G/DL — SIGNIFICANT CHANGE UP (ref 3.3–5)
ALP SERPL-CCNC: 54 U/L — SIGNIFICANT CHANGE UP (ref 40–120)
ALT FLD-CCNC: 23 U/L — SIGNIFICANT CHANGE UP (ref 10–45)
ANION GAP SERPL CALC-SCNC: 13 MMOL/L — SIGNIFICANT CHANGE UP (ref 5–17)
AST SERPL-CCNC: 14 U/L — SIGNIFICANT CHANGE UP (ref 10–40)
BASOPHILS # BLD AUTO: 0.04 K/UL — SIGNIFICANT CHANGE UP (ref 0–0.2)
BASOPHILS NFR BLD AUTO: 0.6 % — SIGNIFICANT CHANGE UP (ref 0–2)
BILIRUB SERPL-MCNC: 0.3 MG/DL — SIGNIFICANT CHANGE UP (ref 0.2–1.2)
BUN SERPL-MCNC: 13 MG/DL — SIGNIFICANT CHANGE UP (ref 7–23)
CALCIUM SERPL-MCNC: 9.1 MG/DL — SIGNIFICANT CHANGE UP (ref 8.4–10.5)
CHLORIDE SERPL-SCNC: 104 MMOL/L — SIGNIFICANT CHANGE UP (ref 96–108)
CO2 SERPL-SCNC: 22 MMOL/L — SIGNIFICANT CHANGE UP (ref 22–31)
CREAT SERPL-MCNC: 0.72 MG/DL — SIGNIFICANT CHANGE UP (ref 0.5–1.3)
EOSINOPHIL # BLD AUTO: 0.09 K/UL — SIGNIFICANT CHANGE UP (ref 0–0.5)
EOSINOPHIL NFR BLD AUTO: 1.3 % — SIGNIFICANT CHANGE UP (ref 0–6)
GLUCOSE SERPL-MCNC: 98 MG/DL — SIGNIFICANT CHANGE UP (ref 70–99)
HCT VFR BLD CALC: 40.7 % — SIGNIFICANT CHANGE UP (ref 34.5–45)
HGB BLD-MCNC: 14.3 G/DL — SIGNIFICANT CHANGE UP (ref 11.5–15.5)
IMM GRANULOCYTES NFR BLD AUTO: 0.3 % — SIGNIFICANT CHANGE UP (ref 0–1.5)
LIDOCAIN IGE QN: 159 U/L — SIGNIFICANT CHANGE UP (ref 73–393)
LYMPHOCYTES # BLD AUTO: 2.07 K/UL — SIGNIFICANT CHANGE UP (ref 1–3.3)
LYMPHOCYTES # BLD AUTO: 29.5 % — SIGNIFICANT CHANGE UP (ref 13–44)
MCHC RBC-ENTMCNC: 31.6 PG — SIGNIFICANT CHANGE UP (ref 27–34)
MCHC RBC-ENTMCNC: 35.1 GM/DL — SIGNIFICANT CHANGE UP (ref 32–36)
MCV RBC AUTO: 89.8 FL — SIGNIFICANT CHANGE UP (ref 80–100)
MONOCYTES # BLD AUTO: 0.42 K/UL — SIGNIFICANT CHANGE UP (ref 0–0.9)
MONOCYTES NFR BLD AUTO: 6 % — SIGNIFICANT CHANGE UP (ref 2–14)
NEUTROPHILS # BLD AUTO: 4.38 K/UL — SIGNIFICANT CHANGE UP (ref 1.8–7.4)
NEUTROPHILS NFR BLD AUTO: 62.3 % — SIGNIFICANT CHANGE UP (ref 43–77)
NRBC # BLD: 0 /100 WBCS — SIGNIFICANT CHANGE UP (ref 0–0)
PLATELET # BLD AUTO: 249 K/UL — SIGNIFICANT CHANGE UP (ref 150–400)
POTASSIUM SERPL-MCNC: 3.8 MMOL/L — SIGNIFICANT CHANGE UP (ref 3.5–5.3)
POTASSIUM SERPL-SCNC: 3.8 MMOL/L — SIGNIFICANT CHANGE UP (ref 3.5–5.3)
PROT SERPL-MCNC: 7.6 G/DL — SIGNIFICANT CHANGE UP (ref 6–8.3)
RBC # BLD: 4.53 M/UL — SIGNIFICANT CHANGE UP (ref 3.8–5.2)
RBC # FLD: 12.1 % — SIGNIFICANT CHANGE UP (ref 10.3–14.5)
SODIUM SERPL-SCNC: 139 MMOL/L — SIGNIFICANT CHANGE UP (ref 135–145)
TROPONIN I SERPL-MCNC: <.017 NG/ML — LOW (ref 0.02–0.06)
WBC # BLD: 7.02 K/UL — SIGNIFICANT CHANGE UP (ref 3.8–10.5)
WBC # FLD AUTO: 7.02 K/UL — SIGNIFICANT CHANGE UP (ref 3.8–10.5)

## 2021-02-02 PROCEDURE — 99284 EMERGENCY DEPT VISIT MOD MDM: CPT | Mod: 25

## 2021-02-02 PROCEDURE — 71045 X-RAY EXAM CHEST 1 VIEW: CPT

## 2021-02-02 PROCEDURE — 80053 COMPREHEN METABOLIC PANEL: CPT

## 2021-02-02 PROCEDURE — 83690 ASSAY OF LIPASE: CPT

## 2021-02-02 PROCEDURE — 93010 ELECTROCARDIOGRAM REPORT: CPT

## 2021-02-02 PROCEDURE — 76705 ECHO EXAM OF ABDOMEN: CPT | Mod: 26,RT

## 2021-02-02 PROCEDURE — 93005 ELECTROCARDIOGRAM TRACING: CPT

## 2021-02-02 PROCEDURE — 71045 X-RAY EXAM CHEST 1 VIEW: CPT | Mod: 26

## 2021-02-02 PROCEDURE — 96374 THER/PROPH/DIAG INJ IV PUSH: CPT

## 2021-02-02 PROCEDURE — 85025 COMPLETE CBC W/AUTO DIFF WBC: CPT

## 2021-02-02 PROCEDURE — 99285 EMERGENCY DEPT VISIT HI MDM: CPT

## 2021-02-02 PROCEDURE — 76705 ECHO EXAM OF ABDOMEN: CPT

## 2021-02-02 PROCEDURE — 96375 TX/PRO/DX INJ NEW DRUG ADDON: CPT

## 2021-02-02 PROCEDURE — 84484 ASSAY OF TROPONIN QUANT: CPT

## 2021-02-02 PROCEDURE — 36415 COLL VENOUS BLD VENIPUNCTURE: CPT

## 2021-02-02 RX ORDER — SODIUM CHLORIDE 9 MG/ML
1000 INJECTION INTRAMUSCULAR; INTRAVENOUS; SUBCUTANEOUS ONCE
Refills: 0 | Status: COMPLETED | OUTPATIENT
Start: 2021-02-02 | End: 2021-02-02

## 2021-02-02 RX ORDER — CHOLECALCIFEROL (VITAMIN D3) 125 MCG
1 CAPSULE ORAL
Qty: 0 | Refills: 0 | DISCHARGE

## 2021-02-02 RX ORDER — FAMOTIDINE 10 MG/ML
20 INJECTION INTRAVENOUS ONCE
Refills: 0 | Status: COMPLETED | OUTPATIENT
Start: 2021-02-02 | End: 2021-02-02

## 2021-02-02 RX ORDER — LISINOPRIL 2.5 MG/1
1 TABLET ORAL
Qty: 0 | Refills: 0 | DISCHARGE

## 2021-02-02 RX ORDER — L.ACIDOPH/B.ANIMALIS/B.LONGUM 15B CELL
1 CAPSULE ORAL
Qty: 0 | Refills: 0 | DISCHARGE

## 2021-02-02 RX ORDER — ONDANSETRON 8 MG/1
4 TABLET, FILM COATED ORAL ONCE
Refills: 0 | Status: COMPLETED | OUTPATIENT
Start: 2021-02-02 | End: 2021-02-02

## 2021-02-02 RX ADMIN — ONDANSETRON 4 MILLIGRAM(S): 8 TABLET, FILM COATED ORAL at 18:08

## 2021-02-02 RX ADMIN — FAMOTIDINE 100 MILLIGRAM(S): 10 INJECTION INTRAVENOUS at 18:08

## 2021-02-02 RX ADMIN — SODIUM CHLORIDE 1000 MILLILITER(S): 9 INJECTION INTRAMUSCULAR; INTRAVENOUS; SUBCUTANEOUS at 18:08

## 2021-02-02 NOTE — ED PROVIDER NOTE - CLINICAL SUMMARY MEDICAL DECISION MAKING FREE TEXT BOX
51 y/o F with PMH of HTN presents to the ED with c/o upper abd pain x 3 days, 3/10 constant pain a/w nausea, pain is worse with foods and laying supine at night. Pt denies CP, SOB, palpitations, v/d, urinary symptoms, f/c, URI symptoms, h/o gallstones or all other complaints . PE as noted above. will check labs and gallbladder US. CXR pending, meds given, reassess

## 2021-02-02 NOTE — ED ADULT NURSE NOTE - CADM POA CENTRAL LINE
Addended by: Rogers Maxwell on: 4/23/2019 03:46 PM     Modules accepted: Orders
No
+ congestion, + nasal drip
- - -

## 2021-02-02 NOTE — ED PROVIDER NOTE - PROGRESS NOTE DETAILS
WILBUR Padgett: Pt s/o to WILBUR Dee to f/u US and dispo WILBUR Dee: Pt signed out to me. Plan to follow up US. US reviewed. Pt feeling better and stable for dc.

## 2021-02-02 NOTE — ED PROVIDER NOTE - OBJECTIVE STATEMENT
49 y/o F with PMH of HTN presents to the ED with c/o upper abd pain x 3 days, 3/10 constant pain a/w nausea, pain is worse with foods and laying supine at night. Pt denies CP, SOB, palpitations, v/d, urinary symptoms, f/c, URI symptoms, h/o gallstones or all other complaints

## 2021-02-02 NOTE — ED PROVIDER NOTE - NSFOLLOWUPINSTRUCTIONS_ED_ALL_ED_FT
Follow up with your primary care physician within 2-3 days. Take the copy of your test results you were given in the emergency room for your doctor to review.     Take over the counter pepcid as needed     Stay hydrated    Return to the ER if your symptoms worsen or for any other medical emergencies  ******************    Abdominal Pain    Many things can cause abdominal pain. Many times, abdominal pain is not caused by a disease and will improve without treatment. Your health care provider will do a physical exam to determine if there is a dangerous cause of your pain; blood tests and imaging may help determine the cause of your pain. However, in many cases, no cause may be found and you may need further testing as an outpatient. Monitor your abdominal pain for any changes.     SEEK IMMEDIATE MEDICAL CARE IF YOU HAVE ANY OF THE FOLLOWING SYMPTOMS: worsening abdominal pain, uncontrollable vomiting, profuse diarrhea, inability to have bowel movements or pass gas, black or bloody stools, fever accompanying chest pain or back pain, or fainting. These symptoms may represent a serious problem that is an emergency. Do not wait to see if the symptoms will go away. Get medical help right away. Call 911 and do not drive yourself to the hospital.

## 2021-02-02 NOTE — ED PROVIDER NOTE - PATIENT PORTAL LINK FT
You can access the FollowMyHealth Patient Portal offered by [facility] by registering at the following website: [email or web address]. By joining [employer]’s FollowMyHealth portal, you will also be able to view your health information using other applications (apps) compatible with our system.

## 2021-02-02 NOTE — ED PROVIDER NOTE - ATTENDING CONTRIBUTION TO CARE
49 y/o F with PMH of HTN presents to the ED with c/o upper abd pain x 3 days, 3/10 constant pain a/w nausea, pain is worse with foods and laying supine at night. Pt denies CP, SOB, palpitations, v/d, urinary symptoms, f/c, URI symptoms, h/o gallstones or all other complaints . PE as noted above. will check labs and gallbladder US. CXR pending, meds given, reassess  Dr. Flores:  I have reviewed and discussed with the PA/ resident the case specifics, including the history, physical assessment, evaluation, conclusion, laboratory results, and medical plan. I agree with the contents, and conclusions. I have personally examined, and interviewed the patient.

## 2021-02-02 NOTE — ED ADULT NURSE NOTE - OBJECTIVE STATEMENT
Patient presents to ED with complaints of intermittent abdominal pain since Saturday with associated nausea that worseness at night time. Patient denies fevers, chills, diarrhea, back pain, urinary symptoms, chest pain, SOB, recent COVID contacts, or other complaints at this time. Patient states she thought she might be constipated so today she took ducolax at home.

## 2021-02-03 RX ORDER — FAMOTIDINE 10 MG/ML
1 INJECTION INTRAVENOUS
Qty: 14 | Refills: 0
Start: 2021-02-03 | End: 2021-02-09

## 2021-06-01 ENCOUNTER — APPOINTMENT (OUTPATIENT)
Dept: MAMMOGRAPHY | Facility: HOSPITAL | Age: 51
End: 2021-06-01
Payer: COMMERCIAL

## 2021-06-01 ENCOUNTER — OUTPATIENT (OUTPATIENT)
Dept: OUTPATIENT SERVICES | Facility: HOSPITAL | Age: 51
LOS: 1 days | End: 2021-06-01
Payer: COMMERCIAL

## 2021-06-01 DIAGNOSIS — Z00.8 ENCOUNTER FOR OTHER GENERAL EXAMINATION: ICD-10-CM

## 2021-06-01 DIAGNOSIS — Z98.890 OTHER SPECIFIED POSTPROCEDURAL STATES: Chronic | ICD-10-CM

## 2021-06-01 PROCEDURE — 77063 BREAST TOMOSYNTHESIS BI: CPT | Mod: 26

## 2021-06-01 PROCEDURE — 77063 BREAST TOMOSYNTHESIS BI: CPT

## 2021-06-01 PROCEDURE — 77067 SCR MAMMO BI INCL CAD: CPT

## 2021-06-01 PROCEDURE — 77067 SCR MAMMO BI INCL CAD: CPT | Mod: 26

## 2021-06-02 ENCOUNTER — APPOINTMENT (OUTPATIENT)
Dept: ULTRASOUND IMAGING | Facility: HOSPITAL | Age: 51
End: 2021-06-02

## 2021-06-22 NOTE — CONSULT NOTE ADULT - CARDIOVASCULAR
--------------  ADMISSION REVIEW     Payor: Monae OTTO/DAQUAN  Subscriber #:  [id number]  Authorization Number: [id number]    Admit date: 6/22/21  Admit time:  4:31 AM       Admitting Physician: Padmini Flores DO  Attending Physician:  Yasmany Mas stated complaint: swellng to tongue  Other systems are as noted in HPI. Constitutional and vital signs reviewed. All other systems reviewed and negative except as noted above.     Physical Exam     ED Triage Vitals   BP 06/21/21 2311 150/84   Pulse 06 Abnormality         Status                     ---------                               -----------         ------                     CBC W/ DIFFERENTIAL[[id number]]          Abnormal            Final result                 Please view results for th Plan     Clinical Impression:  Angioedema, initial encounter  (primary encounter diagnosis)           Signed by Nhan Hillman MD on 6/22/2021 12:06 AM     ED Provider Notes signed by Jv White DO at 6/22/2021  1:16 AM     Author: Ladan Penn HOSPITALIST  History and Physical      Patient Status:  Emergency    1959 MRN [id number]   Location [facility] Attending Piotr Campa DO   Hosp Day # 0 PCP Zuleyka Beltre MD     Chief Complaint: Tongue s 12  aspirin 81 MG Oral Tab, Take 81 mg by mouth daily. , Disp: , Rfl:   Alcohol Swabs (ALCOHOL WIPES) Does not apply Pads, 1 each by Does not apply route daily. , Disp: 100 each, Rfl: 0  Glucose Blood (ONETOUCH VERIO) In Vitro Strip, Test twice daily, Disp: results found for: COVID19    Pro-Calcitonin  No results for input(s): PCT in the last 168 hours. Cardiac  No results for input(s): TROP, PBNP in the last 168 hours. Creatinine Kinase  No results for input(s): CK in the last 168 hours.     Inflammator Units Subcutaneous (Left Upper Arm) Tania Ignacio, RN      methylPREDNISolone Sodium Succ (Solu-MEDROL) 125 MG injection     Date Action Dose Route User    6/21/2021 3891 Given 125 mg (none) Viki Wu, RN      metoprolol succinate (Toprol XL) 24 hr negative detailed exam

## 2021-07-28 ENCOUNTER — APPOINTMENT (OUTPATIENT)
Dept: ULTRASOUND IMAGING | Facility: HOSPITAL | Age: 51
End: 2021-07-28
Payer: COMMERCIAL

## 2021-07-28 ENCOUNTER — OUTPATIENT (OUTPATIENT)
Dept: OUTPATIENT SERVICES | Facility: HOSPITAL | Age: 51
LOS: 1 days | End: 2021-07-28
Payer: COMMERCIAL

## 2021-07-28 DIAGNOSIS — N60.19 DIFFUSE CYSTIC MASTOPATHY OF UNSPECIFIED BREAST: ICD-10-CM

## 2021-07-28 DIAGNOSIS — Z98.890 OTHER SPECIFIED POSTPROCEDURAL STATES: Chronic | ICD-10-CM

## 2021-07-28 PROCEDURE — 76641 ULTRASOUND BREAST COMPLETE: CPT

## 2021-07-28 PROCEDURE — 76641 ULTRASOUND BREAST COMPLETE: CPT | Mod: 26,50

## 2022-04-29 ENCOUNTER — APPOINTMENT (OUTPATIENT)
Dept: ULTRASOUND IMAGING | Facility: HOSPITAL | Age: 52
End: 2022-04-29
Payer: COMMERCIAL

## 2022-04-29 ENCOUNTER — OUTPATIENT (OUTPATIENT)
Dept: OUTPATIENT SERVICES | Facility: HOSPITAL | Age: 52
LOS: 1 days | End: 2022-04-29
Payer: COMMERCIAL

## 2022-04-29 DIAGNOSIS — Z98.890 OTHER SPECIFIED POSTPROCEDURAL STATES: Chronic | ICD-10-CM

## 2022-04-29 DIAGNOSIS — Z00.8 ENCOUNTER FOR OTHER GENERAL EXAMINATION: ICD-10-CM

## 2022-04-29 PROCEDURE — 76536 US EXAM OF HEAD AND NECK: CPT | Mod: 26

## 2022-04-29 PROCEDURE — 76536 US EXAM OF HEAD AND NECK: CPT

## 2022-06-02 ENCOUNTER — NON-APPOINTMENT (OUTPATIENT)
Age: 52
End: 2022-06-02

## 2022-06-15 ENCOUNTER — NON-APPOINTMENT (OUTPATIENT)
Age: 52
End: 2022-06-15

## 2022-08-25 NOTE — PROGRESS NOTE ADULT - NSHPATTENDINGPLANDISCUSS_GEN_ALL_CORE
[FreeTextEntry1] : 66F with hx of TMJ arthralgia and tooth grinding presenting with left ear, temporal, and jaw discomfort for last several days. CT scan negative. Dr NIKKO Nicole this AM and agreed

## 2023-01-01 ENCOUNTER — NON-APPOINTMENT (OUTPATIENT)
Age: 53
End: 2023-01-01

## 2023-01-03 ENCOUNTER — TRANSCRIPTION ENCOUNTER (OUTPATIENT)
Age: 53
End: 2023-01-03

## 2023-01-03 ENCOUNTER — NON-APPOINTMENT (OUTPATIENT)
Age: 53
End: 2023-01-03

## 2023-01-03 ENCOUNTER — APPOINTMENT (OUTPATIENT)
Dept: CARDIOLOGY | Facility: CLINIC | Age: 53
End: 2023-01-03
Payer: COMMERCIAL

## 2023-01-03 VITALS
OXYGEN SATURATION: 99 % | HEIGHT: 68 IN | RESPIRATION RATE: 16 BRPM | TEMPERATURE: 97.9 F | HEART RATE: 76 BPM | WEIGHT: 180 LBS | DIASTOLIC BLOOD PRESSURE: 91 MMHG | SYSTOLIC BLOOD PRESSURE: 156 MMHG | BODY MASS INDEX: 27.28 KG/M2

## 2023-01-03 DIAGNOSIS — I10 ESSENTIAL (PRIMARY) HYPERTENSION: ICD-10-CM

## 2023-01-03 DIAGNOSIS — R00.2 PALPITATIONS: ICD-10-CM

## 2023-01-03 DIAGNOSIS — R55 SYNCOPE AND COLLAPSE: ICD-10-CM

## 2023-01-03 DIAGNOSIS — E78.5 HYPERLIPIDEMIA, UNSPECIFIED: ICD-10-CM

## 2023-01-03 PROCEDURE — 99204 OFFICE O/P NEW MOD 45 MIN: CPT | Mod: 25

## 2023-01-03 PROCEDURE — 93000 ELECTROCARDIOGRAM COMPLETE: CPT | Mod: 59

## 2023-01-03 PROCEDURE — 93270 REMOTE 30 DAY ECG REV/REPORT: CPT

## 2023-01-03 NOTE — REASON FOR VISIT
[FreeTextEntry1] : Cardiology consultation for which and management of palpitations, near syncope.  She has hypertension and hyperlipidemia.\par \par

## 2023-01-03 NOTE — REVIEW OF SYSTEMS
[Chest Discomfort] : chest discomfort [Palpitations] : palpitations [Negative] : Psychiatric [SOB] : no shortness of breath [Dyspnea on exertion] : not dyspnea during exertion [Lower Ext Edema] : no extremity edema [Leg Claudication] : no intermittent leg claudication [Orthopnea] : no orthopnea [PND] : no PND [Syncope] : no syncope [FreeTextEntry5] : near syncope

## 2023-01-03 NOTE — DISCUSSION/SUMMARY
[FreeTextEntry1] : 52-year-old woman presents as a consultation because of concern for palpitations and near syncope.\par She has been symptomatic for the past 1 year.\par Medical problems include hypertension and hyperlipidemia.  s/p prior AV fistula repair by nuerosurgery \par Prior history of palpitations with negative work-up about 10 years ago.\par Blood pressure stable on today's exam.  There is no JVD.  Lung fields are clear.  No edema.  She is euvolemic.\par EKG sinus rhythm, within normal limits.\par Rule out arrhythmia.  Rule out structural heart disease.\par Further evaluation is indicated.\par \par Plan\par 1.  Extended 2-week Holter monitor placed today to evaluate palpitations, rule out arrhythmia.\par 2.  Echocardiogram to evaluate for structural heart disease.\par 3.  Exercise stress test to evaluate functional capacity and to evaluate for stress-induced coronary insufficiency and arrhythmias.\par 4.  Further cardiac recommendations pending above work-up.\par 5.  Also advised her that if she should have a prolonged episode of palpitations she should contact her office or go to urgent care or local emergency room to have EKG done.\par 6. Review blood work done thru PMD office \par 7.  The above was reviewed with the patient, all of her questions have been answered to her satisfaction.\par \par

## 2023-01-03 NOTE — HISTORY OF PRESENT ILLNESS
[FreeTextEntry1] : The patient is a 52-year-old woman presents to the office as a consultation from primary care physician because of concern for palpitations and near syncope.\par She states that for the past 1 year, she has been noting palpitations, sense that her heart is beating fast.  Just prior to the palpitation episode starting she starts to feel very lightheaded.  Lightheaded feeling passes very quickly, ever, palpitations and racing feeling in her chest last for several hours.  During this time, she is aware of an "achiness" in her chest as well.\par There is no associated shortness of breath.  She denies dyspnea on exertion.\par Palpitations and near syncope occur both with activity and at rest.\par No edema, no orthopnea.\par \par Past history: She has a prior history of palpitations.  Underwent noninvasive cardiac work-up in 2012 and states that the work-up was negative.  No firm diagnosis was established.\par She has no history of CAD, MI, CHF, arrhythmias or heart murmurs.\par Hypertension for several years.  Hyperlipidemia.\par History of brain AVM, status post remote embolization procedure.\par Allergies: None.\par Current medications: Lisinopril and atorvastatin.\par Social history: Non-smoker.  Social alcohol.  .  3 children ages 19, 21 and 25.  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

## 2023-01-30 ENCOUNTER — APPOINTMENT (OUTPATIENT)
Dept: CARDIOLOGY | Facility: CLINIC | Age: 53
End: 2023-01-30
Payer: COMMERCIAL

## 2023-01-30 DIAGNOSIS — R94.39 ABNORMAL RESULT OF OTHER CARDIOVASCULAR FUNCTION STUDY: ICD-10-CM

## 2023-01-30 PROCEDURE — 93306 TTE W/DOPPLER COMPLETE: CPT

## 2023-01-30 PROCEDURE — 93015 CV STRESS TEST SUPVJ I&R: CPT

## 2023-02-07 ENCOUNTER — NON-APPOINTMENT (OUTPATIENT)
Age: 53
End: 2023-02-07

## 2023-02-07 DIAGNOSIS — I47.1 SUPRAVENTRICULAR TACHYCARDIA: ICD-10-CM

## 2023-02-07 PROCEDURE — 93272 ECG/REVIEW INTERPRET ONLY: CPT

## 2023-02-07 RX ORDER — METOPROLOL SUCCINATE 25 MG/1
25 TABLET, EXTENDED RELEASE ORAL DAILY
Qty: 30 | Refills: 0 | Status: ACTIVE | COMMUNITY
Start: 2023-02-07 | End: 1900-01-01

## 2023-02-23 ENCOUNTER — OUTPATIENT (OUTPATIENT)
Dept: OUTPATIENT SERVICES | Facility: HOSPITAL | Age: 53
LOS: 1 days | End: 2023-02-23
Payer: COMMERCIAL

## 2023-02-23 ENCOUNTER — APPOINTMENT (OUTPATIENT)
Dept: CT IMAGING | Facility: CLINIC | Age: 53
End: 2023-02-23
Payer: COMMERCIAL

## 2023-02-23 DIAGNOSIS — R94.39 ABNORMAL RESULT OF OTHER CARDIOVASCULAR FUNCTION STUDY: ICD-10-CM

## 2023-02-23 DIAGNOSIS — Z98.890 OTHER SPECIFIED POSTPROCEDURAL STATES: Chronic | ICD-10-CM

## 2023-02-23 DIAGNOSIS — R42 DIZZINESS AND GIDDINESS: ICD-10-CM

## 2023-02-23 DIAGNOSIS — R55 SYNCOPE AND COLLAPSE: ICD-10-CM

## 2023-02-23 DIAGNOSIS — I10 ESSENTIAL (PRIMARY) HYPERTENSION: ICD-10-CM

## 2023-02-23 DIAGNOSIS — E78.5 HYPERLIPIDEMIA, UNSPECIFIED: ICD-10-CM

## 2023-02-23 PROCEDURE — 75574 CT ANGIO HRT W/3D IMAGE: CPT | Mod: 26

## 2023-02-23 PROCEDURE — 75574 CT ANGIO HRT W/3D IMAGE: CPT

## 2023-02-27 ENCOUNTER — NON-APPOINTMENT (OUTPATIENT)
Age: 53
End: 2023-02-27

## 2023-08-20 ENCOUNTER — NON-APPOINTMENT (OUTPATIENT)
Age: 53
End: 2023-08-20

## 2023-09-29 ENCOUNTER — APPOINTMENT (OUTPATIENT)
Dept: MAMMOGRAPHY | Facility: HOSPITAL | Age: 53
End: 2023-09-29
Payer: COMMERCIAL

## 2023-09-29 ENCOUNTER — APPOINTMENT (OUTPATIENT)
Dept: ULTRASOUND IMAGING | Facility: HOSPITAL | Age: 53
End: 2023-09-29
Payer: COMMERCIAL

## 2023-09-29 ENCOUNTER — OUTPATIENT (OUTPATIENT)
Dept: OUTPATIENT SERVICES | Facility: HOSPITAL | Age: 53
LOS: 1 days | End: 2023-09-29
Payer: COMMERCIAL

## 2023-09-29 DIAGNOSIS — Z98.890 OTHER SPECIFIED POSTPROCEDURAL STATES: Chronic | ICD-10-CM

## 2023-09-29 DIAGNOSIS — N60.19 DIFFUSE CYSTIC MASTOPATHY OF UNSPECIFIED BREAST: ICD-10-CM

## 2023-09-29 DIAGNOSIS — Z12.31 ENCOUNTER FOR SCREENING MAMMOGRAM FOR MALIGNANT NEOPLASM OF BREAST: ICD-10-CM

## 2023-09-29 PROCEDURE — 76641 ULTRASOUND BREAST COMPLETE: CPT | Mod: 26,50

## 2023-09-29 PROCEDURE — 77067 SCR MAMMO BI INCL CAD: CPT

## 2023-09-29 PROCEDURE — 77063 BREAST TOMOSYNTHESIS BI: CPT | Mod: 26

## 2023-09-29 PROCEDURE — 76641 ULTRASOUND BREAST COMPLETE: CPT

## 2023-09-29 PROCEDURE — 77067 SCR MAMMO BI INCL CAD: CPT | Mod: 26

## 2023-09-29 PROCEDURE — 77063 BREAST TOMOSYNTHESIS BI: CPT

## 2023-10-10 ENCOUNTER — EMERGENCY (EMERGENCY)
Facility: HOSPITAL | Age: 53
LOS: 1 days | Discharge: HOME | End: 2023-10-10
Attending: EMERGENCY MEDICINE | Admitting: EMERGENCY MEDICINE
Payer: COMMERCIAL

## 2023-10-10 VITALS
HEART RATE: 67 BPM | WEIGHT: 149.91 LBS | HEIGHT: 68 IN | RESPIRATION RATE: 16 BRPM | OXYGEN SATURATION: 98 % | DIASTOLIC BLOOD PRESSURE: 95 MMHG | TEMPERATURE: 98 F | SYSTOLIC BLOOD PRESSURE: 151 MMHG

## 2023-10-10 DIAGNOSIS — Z98.890 OTHER SPECIFIED POSTPROCEDURAL STATES: Chronic | ICD-10-CM

## 2023-10-10 PROCEDURE — 73060 X-RAY EXAM OF HUMERUS: CPT

## 2023-10-10 PROCEDURE — 73060 X-RAY EXAM OF HUMERUS: CPT | Mod: 26,RT

## 2023-10-10 PROCEDURE — 90715 TDAP VACCINE 7 YRS/> IM: CPT

## 2023-10-10 PROCEDURE — 90471 IMMUNIZATION ADMIN: CPT

## 2023-10-10 PROCEDURE — 73030 X-RAY EXAM OF SHOULDER: CPT

## 2023-10-10 PROCEDURE — 73080 X-RAY EXAM OF ELBOW: CPT

## 2023-10-10 PROCEDURE — 99284 EMERGENCY DEPT VISIT MOD MDM: CPT

## 2023-10-10 PROCEDURE — 73030 X-RAY EXAM OF SHOULDER: CPT | Mod: 26,RT

## 2023-10-10 PROCEDURE — 99284 EMERGENCY DEPT VISIT MOD MDM: CPT | Mod: 25

## 2023-10-10 PROCEDURE — 73080 X-RAY EXAM OF ELBOW: CPT | Mod: 26,RT

## 2023-10-10 PROCEDURE — 96372 THER/PROPH/DIAG INJ SC/IM: CPT

## 2023-10-10 RX ORDER — KETOROLAC TROMETHAMINE 30 MG/ML
30 SYRINGE (ML) INJECTION ONCE
Refills: 0 | Status: DISCONTINUED | OUTPATIENT
Start: 2023-10-10 | End: 2023-10-10

## 2023-10-10 RX ORDER — MELOXICAM 15 MG/1
1 TABLET ORAL
Qty: 14 | Refills: 0
Start: 2023-10-10 | End: 2023-10-23

## 2023-10-10 RX ORDER — TETANUS TOXOID, REDUCED DIPHTHERIA TOXOID AND ACELLULAR PERTUSSIS VACCINE, ADSORBED 5; 2.5; 8; 8; 2.5 [IU]/.5ML; [IU]/.5ML; UG/.5ML; UG/.5ML; UG/.5ML
0.5 SUSPENSION INTRAMUSCULAR ONCE
Refills: 0 | Status: COMPLETED | OUTPATIENT
Start: 2023-10-10 | End: 2023-10-10

## 2023-10-10 RX ORDER — OXYCODONE AND ACETAMINOPHEN 5; 325 MG/1; MG/1
1 TABLET ORAL ONCE
Refills: 0 | Status: DISCONTINUED | OUTPATIENT
Start: 2023-10-10 | End: 2023-10-10

## 2023-10-10 RX ADMIN — OXYCODONE AND ACETAMINOPHEN 1 TABLET(S): 5; 325 TABLET ORAL at 18:37

## 2023-10-10 RX ADMIN — TETANUS TOXOID, REDUCED DIPHTHERIA TOXOID AND ACELLULAR PERTUSSIS VACCINE, ADSORBED 0.5 MILLILITER(S): 5; 2.5; 8; 8; 2.5 SUSPENSION INTRAMUSCULAR at 18:37

## 2023-10-10 RX ADMIN — Medication 30 MILLIGRAM(S): at 18:37

## 2023-10-10 NOTE — ED PROVIDER NOTE - TEST CONSIDERED BUT NOT PERFORMED
Tests Considered But Not Performed ct of head pt declined no problems or pain neuro not on anticoagulants

## 2023-10-10 NOTE — ED PROVIDER NOTE - CLINICAL SUMMARY MEDICAL DECISION MAKING FREE TEXT BOX
Pt is a 54 yo F with pmhx of HLD and HTN, currently on Lisinopril and Atorvastatin but not any anticoagulants, who p/w right shoulder pain radiating down her arm after tripping and falling over her dog in the parking lot.    Upon evaluation, patient has the most amount of pain located over her right lateral shoulder. Is unable to move her arm in any direction. Patient is able to move her fingers and does not note any numbness or tingling. Pain is exacerbated when she moves her arm and on palpation. No bruising or deformities are noted at this time. Patient did not FOOSH and took the brunt of the fall onto her lateral arm. Notes some moderate tenderness over her right elbow and right wrist as well. Denies any loss of consciousness at this time. No pain in b/l lower extremities and left arm. Breath and heart sounds are normal.

## 2023-10-10 NOTE — ED PROVIDER NOTE - NS ED ATTENDING STATEMENT MOD
This was a shared visit with the HAMLET. I reviewed and verified the documentation and independently performed the documented:

## 2023-10-10 NOTE — ED PROVIDER NOTE - PATIENT PORTAL LINK FT
You can access the FollowMyHealth Patient Portal offered by [facility] by registering at the following website: [email or web address]. By joining [facility]’s FollowMyHealth portal, you will also be able to view your health information using other applications (apps) compatible with our system.

## 2023-10-10 NOTE — ED ADULT NURSE NOTE - OBJECTIVE STATEMENT
Patient BIBEMS c/o trip and fall over dog and c/o right shoulder, arm and wrist pain. Patient denies anticoagulation use.

## 2023-10-10 NOTE — ED PROVIDER NOTE - ATTENDING APP SHARED VISIT CONTRIBUTION OF CARE
Leukocytosis, unspecified type Pt status post mechanical fall now with rt shoulder pain and a minimally displace proximal humerus fraction. Pt is ambulating normally, no nec or headache. Pt will be placed in a sling and follow up with ortho. I agree with plan.

## 2023-10-10 NOTE — ED PROVIDER NOTE - NSICDXPASTMEDICALHX_GEN_ALL_CORE_FT
PAST MEDICAL HISTORY:  Dural arteriovenous fistula     HTN (hypertension)     Hypercholesterolemia no meds

## 2023-10-10 NOTE — ED PROVIDER NOTE - OBJECTIVE STATEMENT
Pt is a 52 yo F with pmhx of HLD and HTN, currently on Lisinopril and Atorvastatin but not any anticoagulants, who p/w right shoulder pain radiating down her arm after tripping and falling over her dog in the parking lot. Patient notes that she fell onto her side onto the asphalt and has had the pain in her arm ever since. Moving her arm makes the pain more severe but notes that she has mild pain without movement as well. The pain is sharp and is located mostly over her shoulder. She has some scrapes from falling on her forearm as well but did not notice any breaks in her skin anywhere else. Notes that she also hit her head when she fell but denies any acute pain, LOC, or dizziness at this time.   Denies any pain in her left arm or lower extremities. No SOB, obvious deformities, bruising, or puncture wounds. Denies any N/V/D, fever, chills, palpitations.

## 2023-10-10 NOTE — ED PROVIDER NOTE - NSICDXPASTSURGICALHX_GEN_ALL_CORE_FT
PAST SURGICAL HISTORY:  H/O umbilical hernia repair     S/P D&C 2000- miscarriage    S/P inguinal hernia repair 1974

## 2023-10-10 NOTE — ED PROVIDER NOTE - CARE PROVIDER_API CALL
Guillaume Woods  Orthopaedic Surgery  [address], [address]  [place] [address]  Phone: [phone]  Fax: [phone]  Follow Up Time:

## 2023-10-10 NOTE — ED PROVIDER NOTE - CARE PROVIDERS DIRECT ADDRESSES
[email or web address]

## 2023-10-12 ENCOUNTER — APPOINTMENT (OUTPATIENT)
Dept: ORTHOPEDIC SURGERY | Facility: CLINIC | Age: 53
End: 2023-10-12
Payer: COMMERCIAL

## 2023-10-12 VITALS — WEIGHT: 180 LBS | HEIGHT: 68 IN | BODY MASS INDEX: 27.28 KG/M2

## 2023-10-12 PROCEDURE — 99204 OFFICE O/P NEW MOD 45 MIN: CPT

## 2023-10-12 RX ORDER — ATORVASTATIN CALCIUM 10 MG/1
10 TABLET, FILM COATED ORAL
Refills: 0 | Status: ACTIVE | COMMUNITY

## 2023-10-12 RX ORDER — LISINOPRIL 30 MG/1
TABLET ORAL
Refills: 0 | Status: ACTIVE | COMMUNITY

## 2023-10-19 ENCOUNTER — APPOINTMENT (OUTPATIENT)
Dept: ORTHOPEDIC SURGERY | Facility: CLINIC | Age: 53
End: 2023-10-19
Payer: COMMERCIAL

## 2023-10-19 VITALS — WEIGHT: 180 LBS | BODY MASS INDEX: 27.28 KG/M2 | HEIGHT: 68 IN

## 2023-10-19 PROCEDURE — 73030 X-RAY EXAM OF SHOULDER: CPT | Mod: RT

## 2023-10-19 PROCEDURE — 99214 OFFICE O/P EST MOD 30 MIN: CPT

## 2023-10-25 ENCOUNTER — OUTPATIENT (OUTPATIENT)
Dept: OUTPATIENT SERVICES | Facility: HOSPITAL | Age: 53
LOS: 1 days | End: 2023-10-25
Payer: COMMERCIAL

## 2023-10-25 ENCOUNTER — APPOINTMENT (OUTPATIENT)
Dept: ULTRASOUND IMAGING | Facility: HOSPITAL | Age: 53
End: 2023-10-25

## 2023-10-25 ENCOUNTER — APPOINTMENT (OUTPATIENT)
Dept: CT IMAGING | Facility: HOSPITAL | Age: 53
End: 2023-10-25
Payer: COMMERCIAL

## 2023-10-25 DIAGNOSIS — Z98.890 OTHER SPECIFIED POSTPROCEDURAL STATES: Chronic | ICD-10-CM

## 2023-10-25 DIAGNOSIS — S42.254A NONDISPLACED FRACTURE OF GREATER TUBEROSITY OF RIGHT HUMERUS, INITIAL ENCOUNTER FOR CLOSED FRACTURE: ICD-10-CM

## 2023-10-25 PROCEDURE — 76700 US EXAM ABDOM COMPLETE: CPT | Mod: 26

## 2023-10-25 PROCEDURE — 73200 CT UPPER EXTREMITY W/O DYE: CPT | Mod: 26,RT

## 2023-10-25 PROCEDURE — 73200 CT UPPER EXTREMITY W/O DYE: CPT

## 2023-10-25 PROCEDURE — 76700 US EXAM ABDOM COMPLETE: CPT

## 2023-10-26 ENCOUNTER — APPOINTMENT (OUTPATIENT)
Dept: ORTHOPEDIC SURGERY | Facility: CLINIC | Age: 53
End: 2023-10-26
Payer: COMMERCIAL

## 2023-10-26 VITALS — HEIGHT: 68 IN | BODY MASS INDEX: 27.28 KG/M2 | WEIGHT: 180 LBS

## 2023-10-26 PROCEDURE — 99214 OFFICE O/P EST MOD 30 MIN: CPT

## 2023-10-26 PROCEDURE — 73030 X-RAY EXAM OF SHOULDER: CPT | Mod: RT

## 2023-11-02 ENCOUNTER — APPOINTMENT (OUTPATIENT)
Dept: ORTHOPEDIC SURGERY | Facility: CLINIC | Age: 53
End: 2023-11-02
Payer: COMMERCIAL

## 2023-11-02 VITALS — BODY MASS INDEX: 27.28 KG/M2 | HEIGHT: 68 IN | WEIGHT: 180 LBS

## 2023-11-02 PROCEDURE — 99213 OFFICE O/P EST LOW 20 MIN: CPT

## 2023-11-02 PROCEDURE — 73030 X-RAY EXAM OF SHOULDER: CPT | Mod: RT

## 2023-11-07 ENCOUNTER — OUTPATIENT (OUTPATIENT)
Dept: OUTPATIENT SERVICES | Facility: HOSPITAL | Age: 53
LOS: 1 days | End: 2023-11-07
Payer: COMMERCIAL

## 2023-11-07 ENCOUNTER — APPOINTMENT (OUTPATIENT)
Dept: CT IMAGING | Facility: HOSPITAL | Age: 53
End: 2023-11-07
Payer: COMMERCIAL

## 2023-11-07 DIAGNOSIS — Z98.890 OTHER SPECIFIED POSTPROCEDURAL STATES: Chronic | ICD-10-CM

## 2023-11-07 DIAGNOSIS — N28.89 OTHER SPECIFIED DISORDERS OF KIDNEY AND URETER: ICD-10-CM

## 2023-11-07 PROCEDURE — 74178 CT ABD&PLV WO CNTR FLWD CNTR: CPT

## 2023-11-07 PROCEDURE — 74178 CT ABD&PLV WO CNTR FLWD CNTR: CPT | Mod: 26

## 2023-11-16 ENCOUNTER — APPOINTMENT (OUTPATIENT)
Dept: ORTHOPEDIC SURGERY | Facility: CLINIC | Age: 53
End: 2023-11-16
Payer: COMMERCIAL

## 2023-11-16 PROCEDURE — 73030 X-RAY EXAM OF SHOULDER: CPT | Mod: RT

## 2023-11-16 PROCEDURE — 99213 OFFICE O/P EST LOW 20 MIN: CPT

## 2023-12-14 ENCOUNTER — APPOINTMENT (OUTPATIENT)
Dept: ORTHOPEDIC SURGERY | Facility: CLINIC | Age: 53
End: 2023-12-14
Payer: COMMERCIAL

## 2023-12-14 PROCEDURE — 99213 OFFICE O/P EST LOW 20 MIN: CPT

## 2023-12-14 PROCEDURE — 73030 X-RAY EXAM OF SHOULDER: CPT | Mod: RT

## 2024-01-22 ENCOUNTER — APPOINTMENT (OUTPATIENT)
Dept: ORTHOPEDIC SURGERY | Facility: CLINIC | Age: 54
End: 2024-01-22
Payer: COMMERCIAL

## 2024-01-22 VITALS — WEIGHT: 175 LBS | HEIGHT: 68 IN | BODY MASS INDEX: 26.52 KG/M2

## 2024-01-22 DIAGNOSIS — S42.254A NONDISPLACED FRACTURE OF GREATER TUBEROSITY OF RIGHT HUMERUS, INITIAL ENCOUNTER FOR CLOSED FRACTURE: ICD-10-CM

## 2024-01-22 PROCEDURE — 99214 OFFICE O/P EST MOD 30 MIN: CPT | Mod: 25

## 2024-01-22 PROCEDURE — 20610 DRAIN/INJ JOINT/BURSA W/O US: CPT | Mod: RT

## 2024-01-22 PROCEDURE — 73030 X-RAY EXAM OF SHOULDER: CPT | Mod: RT

## 2024-04-11 ENCOUNTER — APPOINTMENT (OUTPATIENT)
Dept: ULTRASOUND IMAGING | Facility: HOSPITAL | Age: 54
End: 2024-04-11
Payer: COMMERCIAL

## 2024-04-11 ENCOUNTER — OUTPATIENT (OUTPATIENT)
Dept: OUTPATIENT SERVICES | Facility: HOSPITAL | Age: 54
LOS: 1 days | End: 2024-04-11
Payer: COMMERCIAL

## 2024-04-11 DIAGNOSIS — E04.1 NONTOXIC SINGLE THYROID NODULE: ICD-10-CM

## 2024-04-11 DIAGNOSIS — Z98.890 OTHER SPECIFIED POSTPROCEDURAL STATES: Chronic | ICD-10-CM

## 2024-04-11 PROCEDURE — 76536 US EXAM OF HEAD AND NECK: CPT | Mod: 26

## 2024-04-11 PROCEDURE — 76536 US EXAM OF HEAD AND NECK: CPT

## 2024-06-23 ENCOUNTER — NON-APPOINTMENT (OUTPATIENT)
Age: 54
End: 2024-06-23

## 2024-07-30 ENCOUNTER — NON-APPOINTMENT (OUTPATIENT)
Age: 54
End: 2024-07-30

## 2025-02-20 ENCOUNTER — APPOINTMENT (OUTPATIENT)
Dept: DERMATOLOGY | Facility: CLINIC | Age: 55
End: 2025-02-20
Payer: COMMERCIAL

## 2025-02-20 DIAGNOSIS — L82.1 OTHER SEBORRHEIC KERATOSIS: ICD-10-CM

## 2025-02-20 DIAGNOSIS — L91.8 OTHER HYPERTROPHIC DISORDERS OF THE SKIN: ICD-10-CM

## 2025-02-20 PROCEDURE — D0127: CPT

## 2025-03-05 ENCOUNTER — APPOINTMENT (OUTPATIENT)
Dept: DERMATOLOGY | Facility: CLINIC | Age: 55
End: 2025-03-05
Payer: COMMERCIAL

## 2025-03-05 PROCEDURE — 99024 POSTOP FOLLOW-UP VISIT: CPT

## 2025-07-07 ENCOUNTER — APPOINTMENT (OUTPATIENT)
Dept: ULTRASOUND IMAGING | Facility: HOSPITAL | Age: 55
End: 2025-07-07
Payer: COMMERCIAL

## 2025-07-07 ENCOUNTER — OUTPATIENT (OUTPATIENT)
Dept: OUTPATIENT SERVICES | Facility: HOSPITAL | Age: 55
LOS: 1 days | End: 2025-07-07
Payer: COMMERCIAL

## 2025-07-07 ENCOUNTER — APPOINTMENT (OUTPATIENT)
Dept: MAMMOGRAPHY | Facility: HOSPITAL | Age: 55
End: 2025-07-07
Payer: COMMERCIAL

## 2025-07-07 DIAGNOSIS — Z12.31 ENCOUNTER FOR SCREENING MAMMOGRAM FOR MALIGNANT NEOPLASM OF BREAST: ICD-10-CM

## 2025-07-07 DIAGNOSIS — N60.19 DIFFUSE CYSTIC MASTOPATHY OF UNSPECIFIED BREAST: ICD-10-CM

## 2025-07-07 DIAGNOSIS — Z98.890 OTHER SPECIFIED POSTPROCEDURAL STATES: Chronic | ICD-10-CM

## 2025-07-07 PROCEDURE — 76641 ULTRASOUND BREAST COMPLETE: CPT | Mod: 26,50

## 2025-07-07 PROCEDURE — 77063 BREAST TOMOSYNTHESIS BI: CPT | Mod: 26

## 2025-07-07 PROCEDURE — 76641 ULTRASOUND BREAST COMPLETE: CPT

## 2025-07-07 PROCEDURE — 77067 SCR MAMMO BI INCL CAD: CPT

## 2025-07-07 PROCEDURE — 77067 SCR MAMMO BI INCL CAD: CPT | Mod: 26

## 2025-07-07 PROCEDURE — 77063 BREAST TOMOSYNTHESIS BI: CPT
